# Patient Record
Sex: MALE | Race: WHITE | NOT HISPANIC OR LATINO | Employment: FULL TIME | URBAN - METROPOLITAN AREA
[De-identification: names, ages, dates, MRNs, and addresses within clinical notes are randomized per-mention and may not be internally consistent; named-entity substitution may affect disease eponyms.]

---

## 2018-05-30 ENCOUNTER — OFFICE VISIT (OUTPATIENT)
Dept: INTERNAL MEDICINE | Facility: CLINIC | Age: 28
End: 2018-05-30
Payer: COMMERCIAL

## 2018-05-30 VITALS
DIASTOLIC BLOOD PRESSURE: 63 MMHG | HEART RATE: 67 BPM | OXYGEN SATURATION: 96 % | SYSTOLIC BLOOD PRESSURE: 97 MMHG | BODY MASS INDEX: 21.8 KG/M2 | WEIGHT: 150.6 LBS

## 2018-05-30 DIAGNOSIS — H69.92 DYSFUNCTION OF LEFT EUSTACHIAN TUBE: Primary | ICD-10-CM

## 2018-05-30 ASSESSMENT — PAIN SCALES - GENERAL: PAINLEVEL: NO PAIN (0)

## 2018-05-30 NOTE — MR AVS SNAPSHOT
After Visit Summary   5/30/2018    Flip Lopez    MRN: [id number]           Patient Information     Date Of Birth          1990        Visit Information        Provider Department      5/30/2018 10:00 AM Cherry Ricketts APRN [facility]        Today's Diagnoses     Dysfunction of left eustachian tube    -  1      Care Instructions      Earache, No Infection (Adult)  Earaches can happen without an infection. This occurs when air and fluid build up behind the eardrum causing a feeling of fullness and discomfort and reduced hearing. This is called otitis media with effusion (OME) or serous otitis media. It means there is fluid in the middle ear. It is not the same as acute otitis media, which is typically from infection.  OME can happen when you have a cold if congestion blocks the passage that drains the middle ear. This passage is called the eustachian tube. OME may also occur with nasal allergies or after a bacterial middle ear infection.    The pain or discomfort may come and go. You may hear clicking or popping sounds when you chew or swallow. You may feel that your balance is off. Or you may hear ringing in the ear.  It often takes from several weeks up to 3 months for the fluid to clear on its own. Oral pain relievers and ear drops help if there is pain. Decongestants and antihistamines sometimes help. Antibiotics don't help since there is no infection. Your doctor may prescribe a nasal spray to help reduce swelling in the nose and eustachian tube. This can allow the ear to drain.  If your OME doesn't improve after 3 months, surgery may be used to drain the fluid and insert a small tube in the eardrum to allow continued drainage.  Because the middle ear fluid can become infected, it is important to watch for signs of an ear infection which may develop later. These signs include increased ear pain, fever, or drainage from the ear.  Home care  The following  guidelines will help you care for yourself at home:    You may use over-the-counter medicine as directed to control pain, unless another medicine was prescribed. If you have chronic liver or kidney disease or ever had a stomach ulcer or GI bleeding, talk with your doctor before using these medicines. Aspirin should never be used in anyone under 18 years of age who is ill with a fever. It may cause severe liver damage.    You may use over-the-counter decongestants such as phenylephrine or pseudoephedrine. But they are not always helpful. Don't use nasal spray decongestants more than 3 days. Longer use can make congestion worse. Prescription nasal sprays from your doctor don't typically have those restrictions.    Antihistamines may help if you are also having allergy symptoms.    You may use medicines such as guaifenesin to thin mucus and promote drainage.  Follow-up care  Follow up with your healthcare provider or as advised if you are not feeling better after 3 days.  When to seek medical advice  Call your healthcare provider right away if any of the following occur:    Your ear pain gets worse or does not start to improve     Fever of 100.4 F (38 C) or higher, or as directed by your healthcare provider    Fluid or blood draining from the ear    Headache or sinus pain    Stiff neck    Unusual drowsiness or confusion  Date Last Reviewed: 10/1/2016    3545-4601 [employer]. [address] [place] [address]. All rights reserved. This information is not intended as a substitute for professional medical care. Always follow your healthcare professional's instructions.                Follow-ups after your visit        Your next 10 appointments already scheduled     Jul 11, 2018  3:45 PM CDT   (Arrive by 3:30 PM)   PHYSICAL with Abisai Arrington MD   [facility] ([facility])    [address] [facility]  [address] [facility] [address] [phone]               Who to contact     Please call your clinic at [phone] to:    Ask questions about your health    Make or cancel appointments    Discuss your medicines    Learn about your test results    Speak to your doctor            Additional Information About Your Visit        MyChart Information     [employer] is an electronic gateway that provides easy, online access to your medical records. With [employer], you can request a clinic appointment, read your test results, renew a prescription or communicate with your care team.     To sign up for [employer] visit the website at [email or web address]   You will be asked to enter the access code listed below, as well as some personal information. Please follow the directions to create your username and password.     Your access code is: [email or web address]  Expires: 2018  9:27 AM     Your access code will  in 90 days. If you need help or a new code, please contact your [facility] or call [phone] for assistance.        Care EveryWhere ID     This is your Care EveryWhere ID. This could be used by other organizations to access your [facility] medical records  [id number]        Your Vitals Were     Pulse Pulse Oximetry BMI (Body Mass Index)             67 96% 21.8 kg/m2          Blood Pressure from Last 3 Encounters:   18 97/63   03/09/15 114/76   09/10/14 132/74    Weight from Last 3 Encounters:   18 68.3 kg (150 lb 9.6 oz)   16 74.8 kg (165 lb)   03/09/15 69.4 kg (153 lb 1.6 oz)              Today, you had the following     No orders found for display       Primary Care Provider Office Phone # Fax #    Abisai Arrington [phone]       [address] [facility] [address]        Equal Access to Services     AUSTIN TOLBERT : Amanda Hunt, bharat dent, rui calles. So [facility] [phone].    ATENCIÓN: jeromy Howard  a covarrubias disposición servicios gratuitos de asistencia lingüística. Michael arceo [phone].    We comply with applicable federal civil rights laws and [place] laws. We do not discriminate on the basis of race, color, national origin, age, disability, sex, sexual orientation, or gender identity.            Thank you!     Thank you for choosing [facility]  for your care. Our goal is always to provide you with excellent care. Hearing back from our patients is one way we can continue to improve our services. Please take a few minutes to complete the written survey that you may receive in the mail after your visit with us. Thank you!             Your Updated Medication List - Protect others around you: Learn how to safely use, store and throw away your medicines at www.disposemymeds.org.          This list is accurate as of 5/30/18 10:05 AM.  Always use your most recent med list.                   Brand Name Dispense Instructions for use Diagnosis    cetirizine 10 MG tablet    zyrTEC     Take 10 mg by mouth daily as needed for allergies        fluticasone 50 MCG/ACT spray    FLONASE     Spray 1 spray into both nostrils daily        IBUPROFEN PO      Take 2 tablets by mouth as needed for moderate pain

## 2018-05-30 NOTE — PROGRESS NOTES
[facility]  Primary Care Center   Cherry LEnma Ricketts, JETT CNP  05/30/2018      Chief Complaint:   Otalgia       History of Present Illness:   Flip Lopez is a 28 year old male with a history of pharyngitis, recurrent tonsillitis and tonsillar abcess who presents for evaluation of ear pain. The patient has had some dull left ear pain and muddled noises for about a week now. The only incident he could remember was opening a window in the car on a road trip and a strong hannah of wind causing a loud noise. He took a Q-tip to see if anything was blocking it but couldn't find anything. The patient does have seasonal allergies and takes a generic Zyrtec and nasal spray to help manage this.       Review of Systems:   Pertinent items are noted in HPI, remainder of complete ROS is negative.      Active Medications:     Current Outpatient Prescriptions:      cetirizine (ZYRTEC) 10 MG tablet, Take 10 mg by mouth daily as needed for allergies, Disp: , Rfl:      fluticasone (FLONASE) 50 MCG/ACT spray, Spray 1 spray into both nostrils daily, Disp: , Rfl:      IBUPROFEN PO, Take 2 tablets by mouth as needed for moderate pain, Disp: , Rfl:       Allergies:   No known drug allergies.      Past Medical History:  History of strep sore throat  Pharyngitis  Recurrent tonsillitis  Tonsillar abscess       Past Surgical History:  PE tubes  Tonsillectomy  [facility]    Family History:   No chronic family history to note.      Social History:   Tobacco Use: none  Alcohol Use: none  PCP: Abisai Arrington      Physical Exam:   BP 97/63 (BP Location: Right arm, Patient Position: Sitting, Cuff Size: Adult Regular)  Pulse 67  Wt 68.3 kg (150 lb 9.6 oz)  SpO2 96%  BMI 21.8 kg/m2   Constitutional: Alert, oriented, pleasant, no acute distress  Head: Normocephalic, atraumatic  Eyes: Extra-ocular movements intact, pupils equally round and reactive bilaterally, no scleral icterus  Ears: Bilateral tympanic membranes pearly gray with  positive light reflex, mildly distended with cerous fluid, EACs clear  ENT: Oropharynx clear, moist mucus membranes, good dentition  Neck: Supple, no lymphadenopathy, no thyromegaly  Cardiovascular: Regular rate and rhythm, no murmurs, rubs or gallops, peripheral pulses full/symmetric  Respiratory: Good air movement bilaterally, lungs clear, no wheezes/rales/rhonchi  Psychiatric: normal mentation, affect and mood     Assessment and Plan:  Dysfunction of left eustachian tube  I recommended Sudafed tablets to help reduce pressure in the ears. Advised staying well hydrated. Continue with Zyrtec and nasal spray; see pt instructions. If the problem worsens with any pain, fever, or decreased hearing, he should follow-up in clinic.       Follow-up: As needed.          Scribe Disclosure:   I, Yazmin Barboza, am serving as a scribe to document services personally performed by JETT Frias CNP at this visit, based upon the provider's statements to me. All documentation has been reviewed by the aforementioned provider prior to being entered into the official medical record.     Portions of this medical record were completed by a scribe. UPON MY REVIEW AND AUTHENTICATION BY ELECTRONIC SIGNATURE, this confirms (a) I performed the applicable clinical services, and (b) the record is accurate.     JETT Frias CNP

## 2018-05-30 NOTE — PATIENT INSTRUCTIONS
Earache, No Infection (Adult)  Earaches can happen without an infection. This occurs when air and fluid build up behind the eardrum causing a feeling of fullness and discomfort and reduced hearing. This is called otitis media with effusion (OME) or serous otitis media. It means there is fluid in the middle ear. It is not the same as acute otitis media, which is typically from infection.  OME can happen when you have a cold if congestion blocks the passage that drains the middle ear. This passage is called the eustachian tube. OME may also occur with nasal allergies or after a bacterial middle ear infection.    The pain or discomfort may come and go. You may hear clicking or popping sounds when you chew or swallow. You may feel that your balance is off. Or you may hear ringing in the ear.  It often takes from several weeks up to 3 months for the fluid to clear on its own. Oral pain relievers and ear drops help if there is pain. Decongestants and antihistamines sometimes help. Antibiotics don't help since there is no infection. Your doctor may prescribe a nasal spray to help reduce swelling in the nose and eustachian tube. This can allow the ear to drain.  If your OME doesn't improve after 3 months, surgery may be used to drain the fluid and insert a small tube in the eardrum to allow continued drainage.  Because the middle ear fluid can become infected, it is important to watch for signs of an ear infection which may develop later. These signs include increased ear pain, fever, or drainage from the ear.  Home care  The following guidelines will help you care for yourself at home:    You may use over-the-counter medicine as directed to control pain, unless another medicine was prescribed. If you have chronic liver or kidney disease or ever had a stomach ulcer or GI bleeding, talk with your doctor before using these medicines. Aspirin should never be used in anyone under 18 years of age who is ill with a fever. It  may cause severe liver damage.    You may use over-the-counter decongestants such as phenylephrine or pseudoephedrine. But they are not always helpful. Don't use nasal spray decongestants more than 3 days. Longer use can make congestion worse. Prescription nasal sprays from your doctor don't typically have those restrictions.    Antihistamines may help if you are also having allergy symptoms.    You may use medicines such as guaifenesin to thin mucus and promote drainage.  Follow-up care  Follow up with your healthcare provider or as advised if you are not feeling better after 3 days.  When to seek medical advice  Call your healthcare provider right away if any of the following occur:    Your ear pain gets worse or does not start to improve     Fever of 100.4 F (38 C) or higher, or as directed by your healthcare provider    Fluid or blood draining from the ear    Headache or sinus pain    Stiff neck    Unusual drowsiness or confusion  Date Last Reviewed: 10/1/2016    7248-1363 [employer]. [address] [place] [address], [place] [address]. All rights reserved. This information is not intended as a substitute for professional medical care. Always follow your healthcare professional's instructions.

## 2018-07-11 ENCOUNTER — OFFICE VISIT (OUTPATIENT)
Dept: FAMILY MEDICINE | Facility: CLINIC | Age: 28
End: 2018-07-11
Payer: COMMERCIAL

## 2018-07-11 VITALS
HEIGHT: 70 IN | OXYGEN SATURATION: 97 % | WEIGHT: 146 LBS | RESPIRATION RATE: 18 BRPM | BODY MASS INDEX: 20.9 KG/M2 | DIASTOLIC BLOOD PRESSURE: 63 MMHG | SYSTOLIC BLOOD PRESSURE: 96 MMHG | HEART RATE: 70 BPM | TEMPERATURE: 98 F

## 2018-07-11 DIAGNOSIS — H65.02 ACUTE SEROUS OTITIS MEDIA OF LEFT EAR, RECURRENCE NOT SPECIFIED: ICD-10-CM

## 2018-07-11 DIAGNOSIS — Z23 IMMUNIZATION DUE: ICD-10-CM

## 2018-07-11 DIAGNOSIS — Z00.00 ROUTINE HISTORY AND PHYSICAL EXAMINATION OF ADULT: Primary | ICD-10-CM

## 2018-07-11 RX ORDER — OXYMETAZOLINE HYDROCHLORIDE 0.05 G/100ML
2 SPRAY NASAL 2 TIMES DAILY
Qty: 20 ML | Refills: 0 | Status: SHIPPED | OUTPATIENT
Start: 2018-07-11 | End: 2021-05-31

## 2018-07-11 ASSESSMENT — PAIN SCALES - GENERAL: PAINLEVEL: NO PAIN (0)

## 2018-07-11 NOTE — NURSING NOTE
Chief Complaint   Patient presents with     Physical     Patient is here for annual physical exam       Yousif Love CMA (AAMA) at 3:46 PM on 7/11/2018

## 2018-07-11 NOTE — PATIENT INSTRUCTIONS
[facility] Medication Refill Request Information:  * Please contact your pharmacy regarding ANY request for medication refills.  ** [facility] Prescription Fax = [phone]  * Please allow 3 business days for routine medication refills.  * Please allow 5 business days for controlled substance medication refills.     [facility] Test Result notification information:  *You will be notified with in 7-10 days of your appointment day regarding the results of your test.  If you are on MyChart you will be notified as soon as the provider has reviewed the results and signed off on them.    [facility] [phone]     Please bring a copy of your Immunization Records at your next visit. Thank you!

## 2018-07-11 NOTE — PROGRESS NOTES
[facility]  Primary Care Center   Abisai Arrington MD  07/11/2018      Chief Complaint:   Physical       History of Present Illness:   Flip Lopez is a 28 year old male with a history of chronic recurrent tonsillitis and tonsillar abscess s/p tonsillectomy (2014), now resolved recurrent infections. He presents alone for his annual physical. He reports that he was evaluated in primary care by Cherry Ricketts on 05/30 for left ear discomfort. He has been using Sudafed and Zyrtec for symptomatic relief. Since this appointment, Flip has only experienced slight resolution of his ear pain. He does occasionally have the sensation of fluid build up behind his left ear, however this is coming and going. He does have an appointment with ENT to follow up next week. Of note, he does have a history of visits to ENT as a child with PE tube placement and for his history of recurrent tonsillitis and tonsillar abscess now s/p tonsillectomy improved. Flip does have occasional neck swelling in relation to his pharyngitis, however this has not been as prominent since his tonsillectomy. He also notes that he is developing pharyngitis symptoms less frequently, only about every 4-6 times a year. This has decreased in frequency as he used to develop symptoms about once every month in the past.     Other:  1. Tetanus- due at this time   2. Recent lipid panel- completed in summer 2018   3. HIV and STI- screening offered  4. Left lower extremity birth jolene- unchanged recently   5. Dental and eye appointments- up to date     Review of Systems:   Pertinent items are noted in HPI, remainder of complete ROS is negative.      Active Medications:      cetirizine (ZYRTEC) 10 MG tablet, Take 10 mg by mouth daily as needed for allergies, Disp: , Rfl:      fluticasone (FLONASE) 50 MCG/ACT spray, Spray 1 spray into both nostrils daily, Disp: , Rfl:      IBUPROFEN PO, Take 2 tablets by mouth as needed for moderate pain, Disp: , Rfl:      "  Allergies:   Review of patient's allergies indicates no known allergies.      Past Medical History:  Streptococcal sore throat   Chronic recurrent tonsillitis   Tonsillar abscess      Past Surgical History:  PE tubes   Tonsillectomy   [facility]     Family History:   Heart disease with stent placement (Mother)- received in 40s  Heart disease with stent placement (Maternal uncle)- received in 50s   Heart disease with pacemaker (Maternal grandfather)   Respiratory disease (Maternal grandmother)   Diabetes     The patient's father and sister are both healthy at this time. Flip's paternal grandparents are both . His family does not have a history of tobacco use.     Social History:   The patient has a significant other and one sister. He is a nonsmoker and does consume alcohol.     Physical Exam:   BP 96/63 (BP Location: Right arm, Patient Position: Sitting, Cuff Size: Adult Regular)  Pulse 70  Temp 98  F (36.7  C) (Oral)  Resp 18  Ht 1.765 m (5' 9.5\")  Wt 66.2 kg (146 lb)  SpO2 97%  BMI 21.25 kg/m2      BMI= Body mass index is 21.25 kg/(m^2).    GENERAL APPEARANCE: healthy, alert and no distress  EYES: Eyes grossly normal to inspection, PERRL and conjunctivae and sclerae normal  HENT: Right ear canal and TM normal. Serous fluid left TM ear. Nose and mouth without ulcers or lesions, oropharynx clear and oral mucous membranes moist  NECK: no adenopathy, no asymmetry, masses, or scars and thyroid normal to palpation   RESP: lungs clear to auscultation - no rales, rhonchi or wheezes  CV: regular rates and rhythm, normal S1 S2, no S3 or S4, no murmur, click or rub, no peripheral edema and peripheral pulses strong  MS: no musculoskeletal defects are noted and gait is age appropriate without ataxia  SKIN: Scattered nevus on the back of no concern. Left leg has erythematous patch birth jolene stable. Left leg inferior to the knee, medial aspect raised dome-shaped dermatofibroma.   NEURO: Normal " strength and tone, mentation intact and speech normal  PSYCH: mentation appears normal and affect normal/bright    Assessment and Plan:  Flip Lopez is a 28 year old male with a history of chronic recurrent tonsillitis and tonsillar abscess s/p tonsillectomy (2014), now resolved recurrent infections. He presents alone for his annual physical. Today he has continued left serous otitis.  1. Acute serous otitis media of left ear, recurrence not specified  Long standing history of ENT related concerns. Most recently, developed left ear discomfort with fluid build up observed on physical examination left ear. Neosynephrine recommended for fluid sensation in left ear with instructions to use 2 sprays into left nare 2x daily for symptomatic ear discomfort relief. Follow up with ENT next week, valsalva maneuvers.  - oxymetazoline (AFRIN) 0.05 % spray  Dispense: 20 mL; Refill: 0    2. Routine history and physical examination of adult  Tetanus booster is due at this time. Administered in clinic today. HIV and STI screening were offered, however deferred at this time. Dental and eye appointments are up to date. Recent lipid panel complete in early summer 2018. Flip will message results via My Chart.   - TDAP VACCINE (BOOSTRIX)  He had biometric screening through work including lipids will sned me results via my chart.    Follow-up: Return in about 1 year (around 7/11/2019) for Physical Exam.         Scribe Disclosure:  I, Luz Gonzalez, am serving as a scribe to document services personally performed by Abisai Arrington MD at this visit, based upon the provider's statements to me. All documentation has been reviewed by the aforementioned provider prior to being entered into the official medical record.     Portions of this medical record were completed by a scribe with my edits. UPON MY REVIEW AND AUTHENTICATION BY ELECTRONIC SIGNATURE, this confirms (a) I performed the applicable clinical services, and (b) the  record is accurate.   Abisai Arrington MD

## 2018-07-11 NOTE — MR AVS SNAPSHOT
After Visit Summary   7/11/2018    Flip Lopez    MRN: [id number]           Patient Information     Date Of Birth          1990        Visit Information        Provider Department      7/11/2018 3:45 PM Abisai Arrington MD [facility]        Today's Diagnoses     Routine history and physical examination of adult    -  1    Immunization due        Acute serous otitis media of left ear, recurrence not specified          Care Instructions    Primary Care Center Medication Refill Request Information:  * Please contact your pharmacy regarding ANY request for medication refills.  ** [facility] Prescription Fax = [phone]  * Please allow 3 business days for routine medication refills.  * Please allow 5 business days for controlled substance medication refills.     Primary Care Center Test Result notification information:  *You will be notified with in 7-10 days of your appointment day regarding the results of your test.  If you are on MyChart you will be notified as soon as the provider has reviewed the results and signed off on them.    Primary Care Center [phone]     Please bring a copy of your Immunization Records at your next visit. Thank you!            Follow-ups after your visit        Follow-up notes from your care team     Discussed this visit Return in about 1 year (around 7/11/2019) for Physical Exam.      Your next 10 appointments already scheduled     Jul 19, 2018  8:30 AM CDT   Walk In From ENT with Ange Bernard   [facility] ([facility])    [address] [place] [address] [phone]            Jul 19, 2018  9:30 AM CDT   (Arrive by 9:15 AM)   New Patient Visit with Leroy Jorgensen MD   [facility] ([facility] [place])    [address] [place] [address] [phone]              Who to contact     Please call your clinic  "at [phone] to:    Ask questions about your health    Make or cancel appointments    Discuss your medicines    Learn about your test results    Speak to your doctor            Additional Information About Your Visit        [employer] Information     [employer] gives you secure access to your electronic health record. If you see a primary care provider, you can also send messages to your care team and make appointments. If you have questions, please call your primary care clinic.  If you do not have a primary care provider, please call [phone] and they will assist you.      [employer] is an electronic gateway that provides easy, online access to your medical records. With [employer], you can request a clinic appointment, read your test results, renew a prescription or communicate with your care team.     To access your existing account, please contact your [facility] or call [phone] for assistance.        Care EveryWhere ID     This is your Care EveryWhere ID. This could be used by other organizations to access your [facility] medical records  [id number]        Your Vitals Were     Pulse Temperature Respirations Height Pulse Oximetry BMI (Body Mass Index)    70 98  F (36.7  C) (Oral) 18 1.765 m (5' 9.5\") 97% 21.25 kg/m2       Blood Pressure from Last 3 Encounters:   07/11/18 96/63   05/30/18 97/63   03/09/15 114/76    Weight from Last 3 Encounters:   07/11/18 66.2 kg (146 lb)   05/30/18 68.3 kg (150 lb 9.6 oz)   12/30/16 74.8 kg (165 lb)              We Performed the Following     TDAP VACCINE (BOOSTRIX)          Today's Medication Changes          These changes are accurate as of 7/11/18  4:24 PM.  If you have any questions, ask your nurse or doctor.               Start taking these medicines.        Dose/Directions    oxymetazoline 0.05 % spray   Commonly known as:  AFRIN   Used for:  Acute serous otitis media of left ear, recurrence not specified   Started by:  Abisai Arrington, " MD        Dose:  2 spray   Spray 2 sprays into left nostril 2 times daily   Quantity:  20 mL   Refills:  0            Where to get your medicines      These medications were sent to [id number] - [place], [place] - [address], [place] [address]     Phone:  [phone]     oxymetazoline 0.05 % spray                Primary Care Provider Office Phone # Fax #    Abisai Arrington [phone]       [address] [facility] [address]        Equal Access to Services     [facility]: Hadii aad ku hadasho Soomaali, waaxda luqadaha, qaybta kaalmada adeegyada, rui gutierrez haycinthia ndiaye . So [facility] [phone].    ATENCIÓN: Si habla español, tiene a covarrubias disposición servicios gratuitos de asistencia lingüística. [facility] [phone].    We comply with applicable federal civil rights laws and [place] laws. We do not discriminate on the basis of race, color, national origin, age, disability, sex, sexual orientation, or gender identity.            Thank you!     Thank you for choosing [facility]  for your care. Our goal is always to provide you with excellent care. Hearing back from our patients is one way we can continue to improve our services. Please take a few minutes to complete the written survey that you may receive in the mail after your visit with us. Thank you!             Your Updated Medication List - Protect others around you: Learn how to safely use, store and throw away your medicines at www.disposemymeds.org.          This list is accurate as of 7/11/18  4:24 PM.  Always use your most recent med list.                   Brand Name Dispense Instructions for use Diagnosis    cetirizine 10 MG tablet    zyrTEC     Take 10 mg by mouth daily as needed for allergies        fluticasone 50 MCG/ACT spray    FLONASE     Spray 1 spray into both nostrils daily        IBUPROFEN PO      Take 2 tablets by  mouth as needed for moderate pain        oxymetazoline 0.05 % spray    AFRIN    20 mL    Spray 2 sprays into left nostril 2 times daily    Acute serous otitis media of left ear, recurrence not specified

## 2018-07-11 NOTE — NURSING NOTE
Administered Tdap vaccine (see Immunizations in Chart Review). Patient tolerated well.  Yousif Love CMA at 4:37 PM on 7/11/2018

## 2018-07-12 DIAGNOSIS — H93.8X9 EAR FULLNESS, UNSPECIFIED LATERALITY: Primary | ICD-10-CM

## 2018-07-13 NOTE — TELEPHONE ENCOUNTER
FUTURE VISIT INFORMATION      FUTURE VISIT INFORMATION:    Date: 07/19/2018    Time: 9:30    Location: [facility]  REFERRAL INFORMATION:    Referring provider:  DR DENTON    Referring providers clinic:  [facility]    Reason for visit/diagnosis  EAR FULLNESS    RECORDS REQUESTED FROM:       Clinic name Comments Records Status Imaging Status   [facility] OFFICE VISIT: 07/11/2018 INTERNAL N/A                                   RECORDS STATUS

## 2018-07-19 ENCOUNTER — PRE VISIT (OUTPATIENT)
Dept: OTOLARYNGOLOGY | Facility: CLINIC | Age: 28
End: 2018-07-19

## 2018-07-19 ENCOUNTER — OFFICE VISIT (OUTPATIENT)
Dept: AUDIOLOGY | Facility: CLINIC | Age: 28
End: 2018-07-19
Payer: COMMERCIAL

## 2018-07-19 ENCOUNTER — OFFICE VISIT (OUTPATIENT)
Dept: OTOLARYNGOLOGY | Facility: CLINIC | Age: 28
End: 2018-07-19
Payer: COMMERCIAL

## 2018-07-19 VITALS — HEIGHT: 70 IN | WEIGHT: 145 LBS | BODY MASS INDEX: 20.76 KG/M2

## 2018-07-19 DIAGNOSIS — H93.8X2 EAR PRESSURE, LEFT: Primary | ICD-10-CM

## 2018-07-19 DIAGNOSIS — H90.3 SNHL (SENSORY-NEURAL HEARING LOSS), ASYMMETRICAL: Primary | ICD-10-CM

## 2018-07-19 ASSESSMENT — PAIN SCALES - GENERAL: PAINLEVEL: NO PAIN (0)

## 2018-07-19 NOTE — NURSING NOTE
"Chief Complaint   Patient presents with     Consult     left ear fullness, ear also feels wet      Height 1.78 m (5' 10.08\"), weight 65.8 kg (145 lb).    David Cordova LPN    "

## 2018-07-19 NOTE — PROGRESS NOTES
The patient presents with a history of a fullness in the left ear for the past few months. He denies previous such symptoms, dizziness, tinnitus or head trauma, migraines or concussions. The patient denies sinusitis, rhinitis, facial pain, nasal obstruction or purulent nasal discharge. The patient denies chronic or recurrent tonsillitis, chronic or recurrent pharyngitis. The patient denies otalgia, otorrhea or ear infections. His Audiogram and Tympanogram are reviewed with him and they demonstrate normal hearing bilaterally and 100% word recognition scores with normal tympanograms.       This patient is seen in consultation at the request of Dr. Abisai Arrington.    All other systems were reviewed and they are either negative or they are not directly pertinent to this Otolaryngology examination.      Past Medical History:    Past Medical History:   Diagnosis Date     History of strep sore throat      Recurrent tonsillitis        Past Surgical History:    Past Surgical History:   Procedure Laterality Date     PE TUBES       TONSILLECTOMY Bilateral 9/10/2014    Procedure: TONSILLECTOMY;  Surgeon: Heather Marie MD;  Location:  OR     [facility]         Medications:      Current Outpatient Prescriptions:      cetirizine (ZYRTEC) 10 MG tablet, Take 10 mg by mouth daily as needed for allergies, Disp: , Rfl:      fluticasone (FLONASE) 50 MCG/ACT spray, Spray 1 spray into both nostrils daily, Disp: , Rfl:      IBUPROFEN PO, Take 2 tablets by mouth as needed for moderate pain, Disp: , Rfl:      oxymetazoline (AFRIN) 0.05 % spray, Spray 2 sprays into left nostril 2 times daily, Disp: 20 mL, Rfl: 0    Allergies:    Seasonal allergies    Physical Examination:    The patient is a well developed, well nourished male in no apparent distress.  He is normocepahlic, atraumatic with pupils equally round and reactive to light.    Oral Cavity Examination: Normal Mucosa with no masses or lesions  Nasal Examination:  Normal Mucosa with no masses or lesions  Ear Examination: Ear canals clear, tympanic membranes and middle ear spaces normal  Neurological Examination: Facial nerve function intact and symmetric  Integumentary Examination: No lesions on the skin of the head or neck  Neck Examination: No masses or lesions, no lymphadenopathy  Endocrine Examination: Normal thyroid examination    Assessment and Plan:    The patient presents with a history of fullness and a sense of altered hearing in the left ear. He will be referred for an MRI scan of the head and an ECOG test and he will be seen again after this testing is completed.     Copy to: Dr. Abisai Arrington

## 2018-07-19 NOTE — PATIENT INSTRUCTIONS
The patient presents with a history of fullness and a sense of altered hearing in the left ear. He will be referred for an MRI scan of the head and an ECOG test and he will be seen again after this testing is completed.

## 2018-07-19 NOTE — MR AVS SNAPSHOT
After Visit Summary   7/19/2018    Flip Lopez    MRN: [id number]           Patient Information     Date Of Birth          1990        Visit Information        Provider Department      7/19/2018 9:30 AM Leroy Jorgensen MD [facility]        Today's Diagnoses     SNHL (sensory-neural hearing loss), asymmetrical    -  1      Care Instructions    The patient presents with a history of fullness and a sense of altered hearing in the left ear. He will be referred for an MRI scan of the head and an ECOG test and he will be seen again after this testing is completed.           Follow-ups after your visit        Additional Services     AUDIOLOGY BALANCE REFERRAL       ECOG                  Your next 10 appointments already scheduled     Jul 30, 2018  1:45 PM CDT   MR BRAIN W/O & W CONTRAST with [address] [facility] [place] [facility] ([facility])    [address] [place] [address] [phone]           Take your medicines as usual, unless your doctor tells you not to. Bring a list of your current medicines to your exam (including vitamins, minerals and over-the-counter drugs).  You may or may not receive intravenous (IV) contrast for this exam pending the discretion of the Radiologist.  You do not need to do anything special to prepare.  The MRI machine uses a strong magnet. Please wear clothes without metal (snaps, zippers). A sweatsuit works well, or we may give you a hospital gown.  Please remove any body piercings and hair extensions before you arrive. You will also remove watches, jewelry, hairpins, wallets, dentures, partial dental plates and hearing aids. You may wear contact lenses, and you may be able to wear your rings. We have a safe place to keep your personal items, but it is safer to leave them at home.  **IMPORTANT** THE INSTRUCTIONS BELOW ARE ONLY FOR THOSE PATIENTS WHO HAVE BEEN PRESCRIBED SEDATION OR  GENERAL ANESTHESIA DURING THEIR MRI PROCEDURE:  IF YOUR DOCTOR PRESCRIBED ORAL SEDATION (take medicine to help you relax during your exam):   You must get the medicine from your doctor (oral medication) before you arrive. Bring the medicine to the exam. Do not take it at home. You ll be told when to take it upon arriving for your exam.   Arrive one hour early. Bring someone who can take you home after the test. Your medicine will make you sleepy. After the exam, you may not drive, take a bus or take a taxi by yourself.  IF YOUR DOCTOR PRESCRIBED IV SEDATION:   Arrive one hour early. Bring someone who can take you home after the test. Your medicine will make you sleepy. After the exam, you may not drive, take a bus or take a taxi by yourself.   No eating 6 hours before your exam. You may have clear liquids up until 4 hours before your exam. (Clear liquids include water, clear tea, black coffee and fruit juice without pulp.)  IF YOUR DOCTOR PRESCRIBED ANESTHESIA (be asleep for your exam):   Arrive 1 1/2 hours early. Bring someone who can take you home after the test. You may not drive, take a bus or take a taxi by yourself.   No eating 8 hours before your exam. You may have clear liquids up until 4 hours before your exam. (Clear liquids include water, clear tea, black coffee and fruit juice without pulp.)   You will spend four to five hours in the recovery room.  Please call the Imaging Department at your exam site with any questions.            Aug 02, 2018  8:00 AM CDT   Electrocochleography with Ange Vanessa, RUDOLPH GRAY ABR MACHINE [address] [place] [address] Audiology ([facility] [place])    [address] [place] [address] [phone]           ECO: Electrocochleography  How is the test done?  The test records electrical signals made by the inner ear and auditory nerve.   First, we clean the skin before placing sticky patches (electrodes) in several places on the head and  shoulders.   One more electrode is placed in the ear canal. This is a small piece of cotton, soaked in water, connected by a tiny wire.   The examiner looks through a microscope to place the electrode on the eardrum. Most patients feel a tickle or slight pressure when this is done. A few patients feel mild discomfort.   After the cotton is in place, most patients do not feel it at all. A foam plug placed in the ear canal holds the electrode in place.   The plug also serves as an earphone for sounds presented as part of the test. The patient hears very rapid clicks that are loud but usually not uncomfortable.   The patient lies on a bed and should try to relax. Many people sleep through the test.  ABR (auditory brainstem response) test The ABR test is usually done at the same time as ECOG. This test records electrical signals made by the brain when it hears sounds. The electrodes can also record brain signals so that both tests can be done at the same time. The two tests together can help find out where your symptoms come from. The two tests usually take about 2 hours. It takes another hour to read the results and write a report. The report is sent to the referring doctor who will choose a treatment.             Sep 19, 2018 10:30 AM CDT   (Arrive by 10:15 AM)   Balance Testing with Denia Kaiser [facility] ([facility] [place])    [address] [place] [address] [phone]           You are scheduled for the following tests: VNG (Videonystagmography). You will wear goggles with small cameras. These record small eye movements as you change position. This test takes 1 to 1 1/2 hours. Rotational Chair. You will sit a chair that has a motor. The room will be dark. You will wear goggles with a camera while the chair rocks slowly from side to side. This test takes 20 to 30 minutes. CDP (Computerized Dynamic Posturography). You will stand on a platform with your  eyes open or closed. It will be unsteady at times. This will tell us how your balance systems work together and how well you sense the ground under your feet. This test takes 30 minutes.  Why am I having these tests? These are tests for your inner ear. They may help us find out why you feel dizzy or off balance.  How do I prepare? Below is a list of things that can affect test results. Do NOT take these for 48 hours before your tests or we will need to reschedule. We want to make sure your test results are correct. Ask your doctor if you have questions about stopping any medicine.  48 hours before the tests: Stop drinking alcohol (beer, wine, liquor). Do NOT take Amitriptyline. Do NOT take medicines for: Allergy or colds. Examples: Benadryl (diphenhydramine), Allegra (fexofenadine), Zyrtec (cetirizine) and any over-the-counter medicine that may cause drowsiness. Anti-anxiety, unless you have been on them every day for the past 8 weeks or more. Examples: Xanax (alprazolam), Klonopin (clonazepam), Valium (diazepam), Ativan (lorazepam). Dizziness and nausea. Examples: Antivert/Bonine/Dramamine Less-Drowsy Formula (meclizine), Dramamine (dimenhydrinate), Trans-derm Scop (Scopolamine), Compazine (prochlorperazine), Phenergan (promethazine). Sleeping. This includes sleeping pills, sedatives, tranquilizers, muscle relaxants and narcotics. It is okay to take medicines for diabetes, heart, blood pressure, seizures, thyroid or an ongoing condition.  The day of the tests:   Please have a  with you.   Do not have caffeine (coffee, soda, chocolate, energy drinks).   Do not smoke or have nicotine for at least 4 hours before the tests. This includes tobacco, e-cigarettes and nicotine gum.   Do not eat 2 to 3 hours before the tests, unless you have diabetes. Then, you should follow your usual diet.   Do not wear any make-up or lotions around your eyes or eyelids.   If you wear contact lenses, be prepared to take them out for  testing; or wear your glasses.   If you take the anti-nausea medicine Zofran (ondansetron), you may bring it with you to take after your tests.  Who should I call if I have questions? If you have any questions, please call the Balance Center at [facility]: [phone]            Sep 20, 2018  1:15 PM CDT   (Arrive by 1:00 PM)   Return Visit with Leroy Jorgensen MD   [facility] Nose [facility] Throat ([facility] [place])    [address] [address] [phone]              Future tests that were ordered for you today     Open Future Orders        Priority Expected Expires Ordered    MRI Brain and Skull Base w & w/o contrast Routine  7/19/2019 7/19/2018    Urea nitrogen Routine  7/19/2019 7/19/2018    Creatinine Routine  7/19/2019 7/19/2018            Who to contact     Please call your clinic at [phone] to:    Ask questions about your health    Make or cancel appointments    Discuss your medicines    Learn about your test results    Speak to your doctor            Additional Information About Your Visit        [employer] Information     [employer] gives you secure access to your electronic health record. If you see a primary care provider, you can also send messages to your care team and make appointments. If you have questions, please call your primary care clinic.  If you do not have a primary care provider, please call [phone] and they will assist you.      [employer] is an electronic gateway that provides easy, online access to your medical records. With [employer], you can request a clinic appointment, read your test results, renew a prescription or communicate with your care team.     To access your existing account, please contact your [facility] or call [phone] for assistance.        Care EveryWhere ID     This is your Care EveryWhere ID. This could be used by other organizations to access your  "[facility] medical records  [id number]        Your Vitals Were     Height BMI (Body Mass Index)                1.78 m (5' 10.08\") 20.76 kg/m2           Blood Pressure from Last 3 Encounters:   07/11/18 96/63   05/30/18 97/63   03/09/15 114/76    Weight from Last 3 Encounters:   07/19/18 65.8 kg (145 lb)   07/11/18 66.2 kg (146 lb)   05/30/18 68.3 kg (150 lb 9.6 oz)              We Performed the Following     AUDIOLOGY BALANCE REFERRAL        Primary Care Provider Office Phone # Fax #    Abisai Arrington [phone]       [address] [facility] [address]        Equal Access to Services     [place] : Hadjeremias paulsono Sofransisca, waaxda luqadaha, qaybta kaalmada adeegyada, rui ndiaye . So [facility] [phone].    ATENCIÓN: Si habla español, tiene a covarrubias disposición servicios gratuitos de asistencia lingüística. [facility] [phone].    We comply with applicable federal civil rights laws and [place] laws. We do not discriminate on the basis of race, color, national origin, age, disability, sex, sexual orientation, or gender identity.            Thank you!     Thank you for choosing [facility]  for your care. Our goal is always to provide you with excellent care. Hearing back from our patients is one way we can continue to improve our services. Please take a few minutes to complete the written survey that you may receive in the mail after your visit with us. Thank you!             Your Updated Medication List - Protect others around you: Learn how to safely use, store and throw away your medicines at www.disposemymeds.org.          This list is accurate as of 7/19/18 11:45 AM.  Always use your most recent med list.                   Brand Name Dispense Instructions for use Diagnosis    cetirizine 10 MG tablet    zyrTEC     Take 10 mg by mouth daily as needed for allergies        fluticasone 50 MCG/ACT spray    FLONASE     Spray 1 spray into " both nostrils daily        IBUPROFEN PO      Take 2 tablets by mouth as needed for moderate pain        oxymetazoline 0.05 % spray    AFRIN    20 mL    Spray 2 sprays into left nostril 2 times daily    Acute serous otitis media of left ear, recurrence not specified

## 2018-07-19 NOTE — MR AVS SNAPSHOT
After Visit Summary   7/19/2018    Flip Lopez    MRN: [id number]           Patient Information     Date Of Birth          1990        Visit Information        Provider Department      7/19/2018 8:30 AM Candy Coy AuD [facility] Audiology        Today's Diagnoses     Ear pressure, left    -  1       Follow-ups after your visit        Your next 10 appointments already scheduled     Jul 19, 2018  9:30 AM CDT   (Arrive by 9:15 AM)   New Patient Visit with Leroy Jorgensen MD   [facility] Ear Nose and Throat ([facility] [place])    [address] [place] [address] [phone]              Who to contact     Please call your clinic at [phone] to:    Ask questions about your health    Make or cancel appointments    Discuss your medicines    Learn about your test results    Speak to your doctor            Additional Information About Your Visit        MyChart Information     M[employer]ent gives you secure access to your electronic health record. If you see a primary care provider, you can also send messages to your care team and make appointments. If you have questions, please call your primary care clinic.  If you do not have a primary care provider, please call [phone] and they will assist you.      Mavent is an electronic gateway that provides easy, online access to your medical records. With Mavent, you can request a clinic appointment, read your test results, renew a prescription or communicate with your care team.     To access your existing account, please contact your [facility] or call [phone] for assistance.        Care EveryWhere ID     This is your Care EveryWhere ID. This could be used by other organizations to access your [place] medical records  [id number]         Blood Pressure from Last 3 Encounters:   07/11/18 96/63   05/30/18 97/63   03/09/15 114/76    Weight from Last 3  Encounters:   07/11/18 66.2 kg (146 lb)   05/30/18 68.3 kg (150 lb 9.6 oz)   12/30/16 74.8 kg (165 lb)              We Performed the Following     AUDIOGRAM/TYMPANOGRAM - INTERFACE     [facility] Eval & Speech Recog (69423)     Tymps / Reflex   (33205)        Primary Care Provider Office Phone # Fax #    Abisai Arrington [phone]        [facility] [address]        Equal Access to Services     [facility]: Hadii aad ku hadasho Soomaali, waaxda luqadaha, qaybta kaalmada adeegyada, waxwild gutierrez haycinthia ndiaye . So [facility] [phone].    ATENCIÓN: Si habla español, tiene a covarrubias disposición servicios gratuitos de asistencia lingüística. [facility] [phone].    We comply with applicable federal civil rights laws and [place] laws. We do not discriminate on the basis of race, color, national origin, age, disability, sex, sexual orientation, or gender identity.            Thank you!     Thank you for choosing [facility] AUDIOLOGY  for your care. Our goal is always to provide you with excellent care. Hearing back from our patients is one way we can continue to improve our services. Please take a few minutes to complete the written survey that you may receive in the mail after your visit with us. Thank you!             Your Updated Medication List - Protect others around you: Learn how to safely use, store and throw away your medicines at www.disposemymeds.org.          This list is accurate as of 7/19/18  8:43 AM.  Always use your most recent med list.                   Brand Name Dispense Instructions for use Diagnosis    cetirizine 10 MG tablet    zyrTEC     Take 10 mg by mouth daily as needed for allergies        fluticasone 50 MCG/ACT spray    FLONASE     Spray 1 spray into both nostrils daily        IBUPROFEN PO      Take 2 tablets by mouth as needed for moderate pain        oxymetazoline 0.05 % spray    AFRIN    20 mL    Spray 2 sprays into left  nostril 2 times daily    Acute serous otitis media of left ear, recurrence not specified

## 2018-07-19 NOTE — LETTER
7/19/2018       RE: Flip Lopez  [address] [place] [address]     Dear Colleague,    Thank you for referring your patient, Flip Lopez, to the [facility] EAR NOSE AND THROAT at [facility]. Please see a copy of my visit note below.    The patient presents with a history of a fullness in the left ear for the past few months. He denies previous such symptoms, dizziness, tinnitus or head trauma, migraines or concussions. The patient denies sinusitis, rhinitis, facial pain, nasal obstruction or purulent nasal discharge. The patient denies chronic or recurrent tonsillitis, chronic or recurrent pharyngitis. The patient denies otalgia, otorrhea or ear infections. His Audiogram and Tympanogram are reviewed with him and they demonstrate normal hearing bilaterally and 100% word recognition scores with normal tympanograms.       This patient is seen in consultation at the request of Dr. Abisai Arrington.    All other systems were reviewed and they are either negative or they are not directly pertinent to this Otolaryngology examination.      Past Medical History:    Past Medical History:   Diagnosis Date     History of strep sore throat      Recurrent tonsillitis        Past Surgical History:    Past Surgical History:   Procedure Laterality Date     PE TUBES       TONSILLECTOMY Bilateral 9/10/2014    Procedure: TONSILLECTOMY;  Surgeon: Haether Marie MD;  Location:  OR     [facility]         Medications:      Current Outpatient Prescriptions:      cetirizine (ZYRTEC) 10 MG tablet, Take 10 mg by mouth daily as needed for allergies, Disp: , Rfl:      fluticasone (FLONASE) 50 MCG/ACT spray, Spray 1 spray into both nostrils daily, Disp: , Rfl:      IBUPROFEN PO, Take 2 tablets by mouth as needed for moderate pain, Disp: , Rfl:      oxymetazoline (AFRIN) 0.05 % spray, Spray 2 sprays into left nostril 2 times daily, Disp: 20 mL, Rfl:  0    Allergies:    Seasonal allergies    Physical Examination:    The patient is a well developed, well nourished male in no apparent distress.  He is normocepahlic, atraumatic with pupils equally round and reactive to light.    Oral Cavity Examination: Normal Mucosa with no masses or lesions  Nasal Examination: Normal Mucosa with no masses or lesions  Ear Examination: Ear canals clear, tympanic membranes and middle ear spaces normal  Neurological Examination: Facial nerve function intact and symmetric  Integumentary Examination: No lesions on the skin of the head or neck  Neck Examination: No masses or lesions, no lymphadenopathy  Endocrine Examination: Normal thyroid examination    Assessment and Plan:    The patient presents with a history of fullness and a sense of altered hearing in the left ear. He will be referred for an MRI scan of the head and an ECOG test and he will be seen again after this testing is completed.     Copy to: Dr. Abisai Arrington      Again, thank you for allowing me to participate in the care of your patient.      Sincerely,    Leroy Jorgensen MD

## 2018-07-30 ENCOUNTER — RADIANT APPOINTMENT (OUTPATIENT)
Dept: MRI IMAGING | Facility: CLINIC | Age: 28
End: 2018-07-30
Attending: OTOLARYNGOLOGY
Payer: COMMERCIAL

## 2018-07-30 DIAGNOSIS — H90.3 SNHL (SENSORY-NEURAL HEARING LOSS), ASYMMETRICAL: ICD-10-CM

## 2018-07-30 RX ORDER — GADOBUTROL 604.72 MG/ML
7.5 INJECTION INTRAVENOUS ONCE
Status: COMPLETED | OUTPATIENT
Start: 2018-07-30 | End: 2018-07-30

## 2018-07-30 RX ADMIN — GADOBUTROL 6.5 ML: 604.72 INJECTION INTRAVENOUS at 13:24

## 2018-07-30 NOTE — DISCHARGE INSTRUCTIONS
MRI Contrast Discharge Instructions    The IV contrast you received today will pass out of your body in your  urine. This will happen in the next 24 hours. You will not feel this process.  Your urine will not change color.    Drink at least 4 extra glasses of water or juice today (unless your doctor  has restricted your fluids). This reduces the stress on your kidneys.  You may take your regular medicines.    If you are on dialysis: It is best to have dialysis today.    If you have a reaction: Most reactions happen right away. If you have  any new symptoms after leaving the hospital (such as hives or swelling),  call your hospital at the correct number below. Or call your family doctor.  If you have breathing distress or wheezing, call 911.    Special instructions: ***    I have read and understand the above information.    Signature:______________________________________ Date:___________    Staff:__________________________________________ Date:___________     Time:__________    [place] [facility] Departments:    ___[place]: [phone]  ___[facility]: [phone]  ___[place]: [phone] ___[facility]: [phone]  ___[facility]: [phone]  ___[facility]: [phone]  ___[phone]  ___[facility]: [phone]  ___[phone]

## 2018-08-02 ENCOUNTER — CARE COORDINATION (OUTPATIENT)
Dept: OTOLARYNGOLOGY | Facility: CLINIC | Age: 28
End: 2018-08-02

## 2018-08-02 ENCOUNTER — OFFICE VISIT (OUTPATIENT)
Dept: AUDIOLOGY | Facility: CLINIC | Age: 28
End: 2018-08-02
Payer: COMMERCIAL

## 2018-08-02 DIAGNOSIS — H93.8X2 PLUGGED FEELING IN EAR, LEFT: Primary | ICD-10-CM

## 2018-08-02 NOTE — PROGRESS NOTES
"AUDIOLOGY REPORT    BACKGROUND INFORMATION: Flip Lopez was seen in Audiology at the [facility] [place] on 8/2/2018 for an electrocochleography (ECochG) evaluation, referred by Dr Leroy Jorgensen. The patient reports that in May of 2018 he started to experience the sensation of \"clogging\" of his left ear. The patient did see a physician for this and was presrived sudafed for 2 weeks and then Afrin for one week and notes that the left-ear symptom is now present episodically.  The patient reports that he did used to have a habit of clenching his jaw in the past but that he has been mindful of this and he is sure that there are no current jaw issues. The most recent hearing evaluation performed 7/19/2018 revealed normal hearing bilaterally with no air-bone gaps present and with symmetry noted between the ears. The patient denies any other ear related issues, vertigo, history of chronic middle ear issues and history of head trauma. The patient is scheduled for an upcoming videonystagmography on 9/19/2018.    TEST RESULTS AND PROCEDURES: Tympanograms showed normal eardrum mobility bilaterally. Using a microscope tympanic membranes were visualized.     A two-channel ECochG recording was performed for clicks bilaterally.  Clicks for the right ear showed normal SP/AP ratios.    Clicks for the left ear showed normal SP/AP ratios.         Click SP/AP ratio   Right ear  .104   Left ear  .216     Abrnormal SP/AP ratios must be greater than .43 for clicks.     SUMMARY AND RECOMMENDATIONS: Today s ECochG revealed normal SP/AP ratios.  Please call this clinic with questions regarding today s results.  Follow-up with Dr Leroy Mota for medical management.    Bia Hall.  Licensed Audiologist  [place] #[id number]              "

## 2018-08-02 NOTE — MR AVS SNAPSHOT
After Visit Summary   8/2/2018    Flip Lopez    MRN: [id number]           Patient Information     Date Of Birth          1990        Visit Information        Provider Department      8/2/2018 8:00 AM Lisa Hanley AuD;  [facility] Audiology        Today's Diagnoses     Plugged feeling in ear, left    -  1       Follow-ups after your visit        Your next 10 appointments already scheduled     Sep 19, 2018 10:30 AM CDT   (Arrive by 10:15 AM)   Balance Testing with Marina Valenzuela [facility] Audiology ([facility] [place])    [address] [address] [phone]           You are scheduled for the following tests: VNG (Videonystagmography). You will wear goggles with small cameras. These record small eye movements as you change position. This test takes 1 to 1 1/2 hours. Rotational Chair. You will sit a chair that has a motor. The room will be dark. You will wear goggles with a camera while the chair rocks slowly from side to side. This test takes 20 to 30 minutes. CDP (Computerized Dynamic Posturography). You will stand on a platform with your eyes open or closed. It will be unsteady at times. This will tell us how your balance systems work together and how well you sense the ground under your feet. This test takes 30 minutes.  Why am I having these tests? These are tests for your inner ear. They may help us find out why you feel dizzy or off balance.  How do I prepare? Below is a list of things that can affect test results. Do NOT take these for 48 hours before your tests or we will need to reschedule. We want to make sure your test results are correct. Ask your doctor if you have questions about stopping any medicine.  48 hours before the tests: Stop drinking alcohol (beer, wine, liquor). Do NOT take Amitriptyline. Do NOT take medicines for: Allergy or colds. Examples: Benadryl (diphenhydramine), Allegra  (fexofenadine), Zyrtec (cetirizine) and any over-the-counter medicine that may cause drowsiness. Anti-anxiety, unless you have been on them every day for the past 8 weeks or more. Examples: Xanax (alprazolam), Klonopin (clonazepam), Valium (diazepam), Ativan (lorazepam). Dizziness and nausea. Examples: Antivert/Bonine/Dramamine Less-Drowsy Formula (meclizine), Dramamine (dimenhydrinate), Trans-derm Scop (Scopolamine), Compazine (prochlorperazine), Phenergan (promethazine). Sleeping. This includes sleeping pills, sedatives, tranquilizers, muscle relaxants and narcotics. It is okay to take medicines for diabetes, heart, blood pressure, seizures, thyroid or an ongoing condition.  The day of the tests:   Please have a  with you.   Do not have caffeine (coffee, soda, chocolate, energy drinks).   Do not smoke or have nicotine for at least 4 hours before the tests. This includes tobacco, e-cigarettes and nicotine gum.   Do not eat 2 to 3 hours before the tests, unless you have diabetes. Then, you should follow your usual diet.   Do not wear any make-up or lotions around your eyes or eyelids.   If you wear contact lenses, be prepared to take them out for testing; or wear your glasses.   If you take the anti-nausea medicine Zofran (ondansetron), you may bring it with you to take after your tests.  Who should I call if I have questions? If you have any questions, please call the Balance Center at [facility]: [phone]            Sep 20, 2018  1:15 PM CDT   (Arrive by 1:00 PM)   Return Visit with Leroy Jorgensen MD   [facility] Ear Nose and Throat ([facility] [place])    [address] [address] [phone]              Who to contact     Please call your clinic at [phone] to:    Ask questions about your health    Make or cancel appointments    Discuss your medicines    Learn about your test results    Speak to your doctor             Additional Information About Your Visit        Gojimohart Information     [email or web address] gives you secure access to your electronic health record. If you see a primary care provider, you can also send messages to your care team and make appointments. If you have questions, please call your primary care clinic.  If you do not have a primary care provider, please call [phone] and they will assist you.      [email or web address] is an electronic gateway that provides easy, online access to your medical records. With [email or web address], you can request a clinic appointment, read your test results, renew a prescription or communicate with your care team.     To access your existing account, please contact your [facility] or call [phone] for assistance.        Care EveryWhere ID     This is your Care EveryWhere ID. This could be used by other organizations to access your [place] medical records  [id number]         Blood Pressure from Last 3 Encounters:   07/11/18 96/63   05/30/18 97/63   03/09/15 114/76    Weight from Last 3 Encounters:   07/19/18 65.8 kg (145 lb)   07/11/18 66.2 kg (146 lb)   05/30/18 68.3 kg (150 lb 9.6 oz)              We Performed the Following     Electrocochleography (04807)     Tympanometry (impedance - testing) (80941)        Primary Care Provider Office Phone # Fax #    Abisai Arrington [phone]       [address] [facility] [address]        Equal Access to Services     [place] TOLBERT : Hadii nathaniel ku hadasho Sopelonali, waaxda luqadaha, qaybta kaalmada adejeremiahyada, rui stauffer. So [facility] [phone].    ATENCIÓN: Si habla español, tiene a covarrubias disposición servicios gratuitos de asistencia lingüística. Llame al [phone].    We comply with applicable federal civil rights laws and [place] laws. We do not discriminate on the basis of race, color, national origin, age, disability, sex, sexual orientation, or gender identity.             Thank you!     Thank you for choosing [facility] AUDIOLOGY  for your care. Our goal is always to provide you with excellent care. Hearing back from our patients is one way we can continue to improve our services. Please take a few minutes to complete the written survey that you may receive in the mail after your visit with us. Thank you!             Your Updated Medication List - Protect others around you: Learn how to safely use, store and throw away your medicines at www.disposemymeds.org.          This list is accurate as of 8/2/18 10:55 AM.  Always use your most recent med list.                   Brand Name Dispense Instructions for use Diagnosis    cetirizine 10 MG tablet    zyrTEC     Take 10 mg by mouth daily as needed for allergies        fluticasone 50 MCG/ACT spray    FLONASE     Spray 1 spray into both nostrils daily        IBUPROFEN PO      Take 2 tablets by mouth as needed for moderate pain        oxymetazoline 0.05 % spray    AFRIN    20 mL    Spray 2 sprays into left nostril 2 times daily    Acute serous otitis media of left ear, recurrence not specified

## 2018-08-02 NOTE — PROGRESS NOTES
[place],    Normal Scan.    Leroy Jorgensen MD on 8/2/2018 at 12:56 AM  8-2-18 above information left on pt voice mail. Pt has balance testing and rtc set up.  Thalia Mcmullen RN  ENT Care Coordinator   Otology  [phone]  8/2/2018 9:01 AM       Details        Reading Physician Reading Date Result Priority       Jesse Parks MD Dianat, Seyed Saeid, MD 7/30/2018 7/30/2018            Narrative             MR BRAIN W/O & W CONTRAST 7/30/2018 2:20 PM    History: ; SNHL (sensory-neural hearing loss), asymmetrical. ?fullness  in the left ear for the past few months.    Comparison: None available    Technique: Axial diffusion and susceptibility-weighted images of the  whole brain were obtained. Coronal 3D T2-weighted and axial  thin-section T1-weighted images were obtained without contrast, with  focus on the internal auditory canals.  Post-intravenous contrast  (using gadolinium) axial and coronal thin-section T1-weighted images  with fat saturation were also obtained, with focus on the internal  auditory canals, with postcontrast T1-weighted images of the whole  brain as well.    Contrast: 6.5ml gadavist    Findings:   No abnormal signal is present in the cerebellum or brainstem. The  seventh and eighth cranial nerves appear normal along their course  intracranially, and the labyrinthine structures are unremarkable on  the T2-weighted images.  The ventricles are normal in size for the  patient's age.    Postcontrast images demonstrate no abnormal enhancement along the  seventh or eighth cranial nerves along their course or of the  labyrinthine structures    Mild mucosal thickening of the maxillary sinuses left greater than  right. No abnormal enhancement is visualized elsewhere intracranially.             Impression             Impression:  1. No abnormality of the seventh or eighth cranial nerves  intracranially.  2. No abnormality of the other visualized infratentorial structures or  supratentorial brain.    I  have personally reviewed the examination and initial interpretation  and I agree with the findings.    SABA DUMONT MD

## 2018-09-19 ENCOUNTER — OFFICE VISIT (OUTPATIENT)
Dept: AUDIOLOGY | Facility: CLINIC | Age: 28
End: 2018-09-19
Payer: COMMERCIAL

## 2018-09-19 DIAGNOSIS — H93.12 TINNITUS OF LEFT EAR: Primary | ICD-10-CM

## 2018-09-19 NOTE — MR AVS SNAPSHOT
After Visit Summary   9/19/2018    Flip Lopez    MRN: [id number]           Patient Information     Date Of Birth          1990        Visit Information        Provider Department      9/19/2018 10:30 AM Denia Kaiser AuD [facility] Audiology        Today's Diagnoses     Tinnitus of left ear    -  1       Follow-ups after your visit        Your next 10 appointments already scheduled     Sep 20, 2018  1:15 PM CDT   (Arrive by 1:00 PM)   Return Visit with Leroy Jorgensen MD   [facility] Ear Nose and Throat ([facility] [place])    [address] [place] [address] [phone]              Who to contact     Please call your clinic at [phone] to:    Ask questions about your health    Make or cancel appointments    Discuss your medicines    Learn about your test results    Speak to your doctor            Additional Information About Your Visit        MyChart Information     Ba[employer]ab gives you secure access to your electronic health record. If you see a primary care provider, you can also send messages to your care team and make appointments. If you have questions, please call your primary care clinic.  If you do not have a primary care provider, please call [phone] and they will assist you.      [employer]ab is an electronic gateway that provides easy, online access to your medical records. With [employer], you can request a clinic appointment, read your test results, renew a prescription or communicate with your care team.     To access your existing account, please contact your [facility] or call [phone] for assistance.        Care EveryWhere ID     This is your Care EveryWhere ID. This could be used by other organizations to access your [place] medical records  [id number]         Blood Pressure from Last 3 Encounters:   07/11/18 96/63   05/30/18 97/63   03/09/15 114/76    Weight from Last 3  Encounters:   07/19/18 65.8 kg (145 lb)   07/11/18 66.2 kg (146 lb)   05/30/18 68.3 kg (150 lb 9.6 oz)              We Performed the Following     Caloric Vestibular Test, W./ Rec, Bilateral, [place], 4 Irrigations (70147)     Oscillating Track Test (12756)     Positional Nystagmus W/ Recording (91748)     Spontaneous Nystagmus W/ Recording (91112)     Tympanometry (impedance - testing) (15275)        Primary Care Provider Office Phone # Fax #    Abisai Arrington [phone] [address] [facility] [address]        Equal Access to Services     [place] TOLBERT : Amanda Hunt, wabeba dent, qaybta kaalmada melchor, rui stauffer. So [facility] [phone].    ATENCIÓN: Si habla español, tiene a covarrubias disposición servicios gratuitos de asistencia lingüística. Llame al [phone].    We comply with applicable federal civil rights laws and [place] laws. We do not discriminate on the basis of race, color, national origin, age, disability, sex, sexual orientation, or gender identity.            Thank you!     Thank you for choosing [facility] AUDIOLOGY  for your care. Our goal is always to provide you with excellent care. Hearing back from our patients is one way we can continue to improve our services. Please take a few minutes to complete the written survey that you may receive in the mail after your visit with us. Thank you!             Your Updated Medication List - Protect others around you: Learn how to safely use, store and throw away your medicines at www.disposemymeds.org.          This list is accurate as of 9/19/18  2:58 PM.  Always use your most recent med list.                   Brand Name Dispense Instructions for use Diagnosis    cetirizine 10 MG tablet    zyrTEC     Take 10 mg by mouth daily as needed for allergies        fluticasone 50 MCG/ACT spray    FLONASE     Spray 1 spray into both nostrils daily        IBUPROFEN PO      Take 2  tablets by mouth as needed for moderate pain        oxymetazoline 0.05 % spray    AFRIN    20 mL    Spray 2 sprays into left nostril 2 times daily    Acute serous otitis media of left ear, recurrence not specified

## 2018-09-19 NOTE — PROGRESS NOTES
AUDIOLOGY REPORT-BALANCE ASSESSMENT    SUBJECTIVE: Flip Lopez, 28 year old, was seen in Audiology at the [facility] on 9/19/2018, for videonystagmography (VNG), referred by Leroy Jorgensen MD. The patient reports that a few months ago he started having symptoms of left ear fullness accompanied by tinnitus in the left ear, with a change in sound quality. He denies hearing fluctuations. Since that time he reports the symptoms in his left ear have improved and he only has a very mild intermittent tinnitus at this time. He denies any feelings of imbalance, dizziness, or spinning sensation through out this time. He denies migraines, head trauma, vision concerns, or any other major medical conditions. Hearing evaluations have revealed normal hearing bilaterally. Flip has not taken any antivestibular medications in the past 48 hours.    OBJECTIVE:  Dizziness Handicap Inventory (DHI): 4/100 no perceived impairment      Videonystagmography (VNG) testing:  Prescreening:  Tympanograms: normal eardrum mobility bilaterally. Note: this test is completed to determine the status of the middle ear before irrigations are completed.  Ocular range of motion and ocular counter roll: Normal  Cross/cover:Normal  Head Thrust: Negative     Nystagmus Tests:  Gaze-Horizontal with fixation:   Center: Normal   Right: Normal   Left: Normal  Gaze-Vertical with fixation:   Up: Normal   Down: Normal  Gaze with fixation denied   Center: Normal   Right: Normal   Left: Normal   Up: Normal  High Frequency Headshake:   Horizontal: Negative   Vertical: Negative    Lexie-Hallpike Head Right: Negative for nystagmus & symptoms   [place]-Hallpike Head Left: Likely negative for PC-BPPV. One burst of 4 deg/sec down-beating with 3 deg/sec left-beating nystagmus that lasts 4-5 seconds. No torsion and no symptoms of dizziness.  Repeat [place]-Hallpike Head Left: Likely negative for PC-BPPV. One burst of 4  deg/sec down-beating with 3 deg/sec left-beating nystagmus that lasts 4-5 seconds. No torsion and no symptoms of dizziness.  Roll Test Head Right: Negative for nystagmus & symptoms   Roll Test Head Left: Negative for nystagmus & symptoms     Positionals: Supine: Normal  Positionals: Body Right: Normal  Positionals: Body Left: Normal  Positionals: Pre-Caloric: Normal    Oculomotor Tests:  Saccades: Normal  Anti-saccades: Pt can complete task  Pursuit: Normal    Calorics :  (Tested at 44 degrees and 30 degrees Celsius for 30 seconds for warm and cool water, respectively):  Right Warm Eye Speed: 60 degrees per second right beating  Left Warm Eye Speed: 71 degrees per second left beating  Right Cool Eye Speed: 38 degrees per second left beating  Left Cool Eye Speed: 29 degrees per second right beating  Difference between ear: 1% right hypofunction. (Greater than 25% considered clinically significant.)  Fixation Index: 0.13 Normal  Overall caloric test: Normal    ASSESSMENT:  1. There were no indications of central vestibular system involvement noted on today's exam.     2. There were no indications of peripheral vestibular system involvement noted on today's exam.     3.Non-localizing; Burst of down-beating nystagmus combined with left-beating nystagmus with no symptoms of dizziness present during left Lexie-Hallpike. Repeatable. Not representative  of traditional PC-BPPV. Possible central origin.     PLAN:  Follow-up with Leroy Jorgensen MD for medical management.  Consider referral to vestibular PT for reassessment of left Lexie-Hallpike. However, as patient is non symptomatic, unsure if any treatment is warranted at this time. Please call this clinic at [phone] with questions regarding these results or recommendations.         Maura Vazquez, [facility]  Licensed Audiologist  [place] #[id number]

## 2018-09-20 ENCOUNTER — OFFICE VISIT (OUTPATIENT)
Dept: OTOLARYNGOLOGY | Facility: CLINIC | Age: 28
End: 2018-09-20
Payer: COMMERCIAL

## 2018-09-20 DIAGNOSIS — M26.609 TEMPOROMANDIBULAR JOINT DISORDER: Primary | ICD-10-CM

## 2018-09-20 ASSESSMENT — PAIN SCALES - GENERAL: PAINLEVEL: NO PAIN (0)

## 2018-09-20 NOTE — PATIENT INSTRUCTIONS
The patient presents with a history of fullness and a sense of altered hearing in the left ear. He was referred for an MRI scan of the head and an ECOG test and these evaluations are negative. He will be referred to our Dental specialists to assess for temporomandibular joint disorder as a cause of his symptoms.

## 2018-09-20 NOTE — LETTER
9/20/2018       RE: Flip Lopez  [address] [place] [address]     Dear Colleague,    Thank you for referring your patient, Flip Lopez, to the [facility] EAR NOSE AND THROAT at [facility]. Please see a copy of my visit note below.    The patient presents with a history of a fullness in the left ear for the past few months. He denies previous such symptoms, dizziness, tinnitus or head trauma, migraines or concussions. The patient's vestibular testing and MRI scan are normal and these findings are reviewed with him. His symptoms have not changed.       All other systems were reviewed and they are either negative or they are not directly pertinent to this Otolaryngology examination.      Past Medical History:    Past Medical History:   Diagnosis Date     History of strep sore throat      Recurrent tonsillitis        Past Surgical History:    Past Surgical History:   Procedure Laterality Date     PE TUBES       TONSILLECTOMY Bilateral 9/10/2014    Procedure: TONSILLECTOMY;  Surgeon: Heather Marie MD;  Location:  OR     [facility]         Medications:      Current Outpatient Prescriptions:      cetirizine (ZYRTEC) 10 MG tablet, Take 10 mg by mouth daily as needed for allergies, Disp: , Rfl:      fluticasone (FLONASE) 50 MCG/ACT spray, Spray 1 spray into both nostrils daily, Disp: , Rfl:      IBUPROFEN PO, Take 2 tablets by mouth as needed for moderate pain, Disp: , Rfl:      oxymetazoline (AFRIN) 0.05 % spray, Spray 2 sprays into left nostril 2 times daily, Disp: 20 mL, Rfl: 0    Allergies:    Seasonal allergies    Physical Examination:    The patient is a well developed, well nourished male in no apparent distress.  He is normocepahlic, atraumatic with pupils equally round and reactive to light.    Oral Cavity Examination: Normal Mucosa with no masses or lesions  Nasal Examination: Normal Mucosa with no masses or  lesions  Neurological Examination: Facial nerve function intact and symmetric  Integumentary Examination: No lesions on the skin of the head or neck   Temporomandibular Joint Examination: Malrotation of the temporomandibular joints bilaterally.     Assessment and Plan:    The patient presents with a history of fullness and a sense of altered hearing in the left ear. He was referred for an MRI scan of the head and an ECOG test and these evaluations are negative. He will be referred to our Dental specialists to assess for temporomandibular joint disorder as a cause of his symptoms.      Copy to: Dr. Abisai Arrington      Again, thank you for allowing me to participate in the care of your patient.      Sincerely,    Leroy Jorgensen MD

## 2018-09-20 NOTE — PROGRESS NOTES
The patient presents with a history of a fullness in the left ear for the past few months. He denies previous such symptoms, dizziness, tinnitus or head trauma, migraines or concussions. The patient's vestibular testing and MRI scan are normal and these findings are reviewed with him. His symptoms have not changed.       All other systems were reviewed and they are either negative or they are not directly pertinent to this Otolaryngology examination.      Past Medical History:    Past Medical History:   Diagnosis Date     History of strep sore throat      Recurrent tonsillitis        Past Surgical History:    Past Surgical History:   Procedure Laterality Date     PE TUBES       TONSILLECTOMY Bilateral 9/10/2014    Procedure: TONSILLECTOMY;  Surgeon: Heather Marie MD;  Location:  OR     [facility]         Medications:      Current Outpatient Prescriptions:      cetirizine (ZYRTEC) 10 MG tablet, Take 10 mg by mouth daily as needed for allergies, Disp: , Rfl:      fluticasone (FLONASE) 50 MCG/ACT spray, Spray 1 spray into both nostrils daily, Disp: , Rfl:      IBUPROFEN PO, Take 2 tablets by mouth as needed for moderate pain, Disp: , Rfl:      oxymetazoline (AFRIN) 0.05 % spray, Spray 2 sprays into left nostril 2 times daily, Disp: 20 mL, Rfl: 0    Allergies:    Seasonal allergies    Physical Examination:    The patient is a well developed, well nourished male in no apparent distress.  He is normocepahlic, atraumatic with pupils equally round and reactive to light.    Oral Cavity Examination: Normal Mucosa with no masses or lesions  Nasal Examination: Normal Mucosa with no masses or lesions  Neurological Examination: Facial nerve function intact and symmetric  Integumentary Examination: No lesions on the skin of the head or neck   Temporomandibular Joint Examination: Malrotation of the temporomandibular joints bilaterally.     Assessment and Plan:    The patient presents with a history of  fullness and a sense of altered hearing in the left ear. He was referred for an MRI scan of the head and an ECOG test and these evaluations are negative. He will be referred to our Dental specialists to assess for temporomandibular joint disorder as a cause of his symptoms.      Copy to: Dr. Abisai Arrington

## 2018-09-20 NOTE — MR AVS SNAPSHOT
After Visit Summary   9/20/2018    Flip Lopez    MRN: [id number]           Patient Information     Date Of Birth          1990        Visit Information        Provider Department      9/20/2018 1:15 PM Leroy Jorgensen MD [facility] Ear Nose [facility] Thro[facility]        Today's Diagnoses     Temporomandibular joint disorder    -  1      Care Instructions    The patient presents with a history of fullness and a sense of altered hearing in the left ear. He was referred for an MRI scan of the head and an ECOG test and these evaluations are negative. He will be referred to our Dental specialists to assess for temporomandibular joint disorder as a cause of his symptoms.            Follow-ups after your visit        Additional Services     DENTAL REFERRAL       Consult for dental evaluation of temporomandibular joint disorder                  Who to contact     Please call your clinic at [phone] to:    Ask questions about your health    Make or cancel appointments    Discuss your medicines    Learn about your test results    Speak to your doctor            Additional Information About Your Visit        [employer] Information     [employer] gives you secure access to your electronic health record. If you see a primary care provider, you can also send messages to your care team and make appointments. If you have questions, please call your primary care clinic.  If you do not have a primary care provider, please call [phone] and they will assist you.      [employer] is an electronic gateway that provides easy, online access to your medical records. With [employer], you can request a clinic appointment, read your test results, renew a prescription or communicate with your care team.     To access your existing account, please contact your [facility] or call [phone] for assistance.        Care EveryWhere ID     This is your Care EveryWhere ID. This could be used by  other organizations to access your [place] medical records  [id number]         Blood Pressure from Last 3 Encounters:   07/11/18 96/63   05/30/18 97/63   03/09/15 114/76    Weight from Last 3 Encounters:   07/19/18 65.8 kg (145 lb)   07/11/18 66.2 kg (146 lb)   05/30/18 68.3 kg (150 lb 9.6 oz)              We Performed the Following     DENTAL REFERRAL        Primary Care Provider Office Phone # Fax #    Abisai Arrington [phone] [address] [facility] [address]        Equal Access to Services     [facility]: Hadii aad elise hadasho Sofransisca, waaxda luqadaha, qaybta kaalmada adejeremiahyada, rui ndiaye . So [facility] [phone].    ATENCIÓN: Si habla español, tiene a covarrubias disposición servicios gratuitos de asistencia lingüística. [facility] [phone].    We comply with applicable federal civil rights laws and [place] laws. We do not discriminate on the basis of race, color, national origin, age, disability, sex, sexual orientation, or gender identity.            Thank you!     Thank you for choosing [facility] EAR NOSE AND THROAT  for your care. Our goal is always to provide you with excellent care. Hearing back from our patients is one way we can continue to improve our services. Please take a few minutes to complete the written survey that you may receive in the mail after your visit with us. Thank you!             Your Updated Medication List - Protect others around you: Learn how to safely use, store and throw away your medicines at www.disposemymeds.org.          This list is accurate as of 9/20/18  1:37 PM.  Always use your most recent med list.                   Brand Name Dispense Instructions for use Diagnosis    cetirizine 10 MG tablet    zyrTEC     Take 10 mg by mouth daily as needed for allergies        fluticasone 50 MCG/ACT spray    FLONASE     Spray 1 spray into both nostrils daily        IBUPROFEN PO      Take 2 tablets by mouth as needed for  moderate pain        oxymetazoline 0.05 % spray    AFRIN    20 mL    Spray 2 sprays into left nostril 2 times daily    Acute serous otitis media of left ear, recurrence not specified

## 2018-10-11 ENCOUNTER — TRANSFERRED RECORDS (OUTPATIENT)
Dept: HEALTH INFORMATION MANAGEMENT | Facility: CLINIC | Age: 28
End: 2018-10-11

## 2019-05-14 ENCOUNTER — OFFICE VISIT (OUTPATIENT)
Dept: INTERNAL MEDICINE | Facility: CLINIC | Age: 29
End: 2019-05-14
Payer: COMMERCIAL

## 2019-05-14 VITALS
DIASTOLIC BLOOD PRESSURE: 68 MMHG | HEART RATE: 60 BPM | RESPIRATION RATE: 16 BRPM | SYSTOLIC BLOOD PRESSURE: 105 MMHG | BODY MASS INDEX: 19.76 KG/M2 | WEIGHT: 138 LBS

## 2019-05-14 DIAGNOSIS — R10.32 ABDOMINAL PAIN, LEFT LOWER QUADRANT: Primary | ICD-10-CM

## 2019-05-14 DIAGNOSIS — R10.32 ABDOMINAL PAIN, LEFT LOWER QUADRANT: ICD-10-CM

## 2019-05-14 LAB
ALBUMIN SERPL-MCNC: 4 G/DL (ref 3.4–5)
ALP SERPL-CCNC: 64 U/L (ref 40–150)
ALT SERPL W P-5'-P-CCNC: 16 U/L (ref 0–70)
ANION GAP SERPL CALCULATED.3IONS-SCNC: 4 MMOL/L (ref 3–14)
AST SERPL W P-5'-P-CCNC: 13 U/L (ref 0–45)
BASOPHILS # BLD AUTO: 0.1 10E9/L (ref 0–0.2)
BASOPHILS NFR BLD AUTO: 0.6 %
BILIRUB SERPL-MCNC: 0.5 MG/DL (ref 0.2–1.3)
BUN SERPL-MCNC: 8 MG/DL (ref 7–30)
CALCIUM SERPL-MCNC: 9.2 MG/DL (ref 8.5–10.1)
CHLORIDE SERPL-SCNC: 104 MMOL/L (ref 94–109)
CO2 SERPL-SCNC: 31 MMOL/L (ref 20–32)
CREAT SERPL-MCNC: 1.13 MG/DL (ref 0.66–1.25)
DIFFERENTIAL METHOD BLD: NORMAL
EOSINOPHIL # BLD AUTO: 0.2 10E9/L (ref 0–0.7)
EOSINOPHIL NFR BLD AUTO: 2.3 %
ERYTHROCYTE [DISTWIDTH] IN BLOOD BY AUTOMATED COUNT: 12.7 % (ref 10–15)
GFR SERPL CREATININE-BSD FRML MDRD: 87 ML/MIN/{1.73_M2}
GLUCOSE SERPL-MCNC: 87 MG/DL (ref 70–99)
HCT VFR BLD AUTO: 47.3 % (ref 40–53)
HGB BLD-MCNC: 15.4 G/DL (ref 13.3–17.7)
IMM GRANULOCYTES # BLD: 0.1 10E9/L (ref 0–0.4)
IMM GRANULOCYTES NFR BLD: 0.7 %
LYMPHOCYTES # BLD AUTO: 1.8 10E9/L (ref 0.8–5.3)
LYMPHOCYTES NFR BLD AUTO: 20.5 %
MCH RBC QN AUTO: 30.3 PG (ref 26.5–33)
MCHC RBC AUTO-ENTMCNC: 32.6 G/DL (ref 31.5–36.5)
MCV RBC AUTO: 93 FL (ref 78–100)
MONOCYTES # BLD AUTO: 0.7 10E9/L (ref 0–1.3)
MONOCYTES NFR BLD AUTO: 8.1 %
NEUTROPHILS # BLD AUTO: 6 10E9/L (ref 1.6–8.3)
NEUTROPHILS NFR BLD AUTO: 67.8 %
NRBC # BLD AUTO: 0 10*3/UL
NRBC BLD AUTO-RTO: 0 /100
PLATELET # BLD AUTO: 189 10E9/L (ref 150–450)
POTASSIUM SERPL-SCNC: 4.4 MMOL/L (ref 3.4–5.3)
PROT SERPL-MCNC: 7.7 G/DL (ref 6.8–8.8)
RBC # BLD AUTO: 5.09 10E12/L (ref 4.4–5.9)
SODIUM SERPL-SCNC: 139 MMOL/L (ref 133–144)
WBC # BLD AUTO: 8.8 10E9/L (ref 4–11)

## 2019-05-14 RX ORDER — FAMOTIDINE 10 MG
10 TABLET ORAL 2 TIMES DAILY
COMMUNITY
End: 2021-05-31

## 2019-05-14 ASSESSMENT — PAIN SCALES - GENERAL: PAINLEVEL: MILD PAIN (2)

## 2019-05-14 NOTE — PATIENT INSTRUCTIONS
Primary Care Center Phone Number [phone]  Primary Care Center Medication Refill Request Information:  * Please contact your pharmacy regarding ANY request for medication refills.  ** [facility] Prescription Fax = [phone]  * Please allow 3 business days for routine medication refills.  * Please allow 5 business days for controlled substance medication refills.     Primary Care Center Test Result notification information:  *You will be notified with in 7-10 days of your appointment day regarding the results of your test.  If you are on MyChart you will be notified as soon as the provider has reviewed the results and signed off on them.               [facility]         Internal Medicine Resident                   Continuity Clinic    Who We Are    Resident Continuity Clinic is a part of the [facility].  Resident physicians see patients independently and establish a relationship with them over the course of their three-year residency program.  As with the Primary Care Clinic, our Resident Continuity Clinic models a group practice.  If your doctor is not available, you will be able to see another resident physician.  At the end of a resident s training, patients will be transitioned to a new resident physician for ongoing care.     We treat patients with a wide array of medical needs from routine physicals, to acute illnesses, to diabetes and blood pressure management, to complex medical illness.  What is a Resident Physician?    Resident physicians hold medical degrees and are doctors. They are training to become specialists in Internal Medicine. They work under the supervision of board-certified faculty physicians.  Expectations for Your Care    We strive to provide accessible, quality care at all times.    In order to provide this care, it is best to see your primary care resident doctor consistently rather switching between providers.  In the event you do see another physician, you  should schedule a follow-up visit with your usual primary care doctor.    If you are transitioning your care from another clinic, it is helpful to have your records available for your doctor to review.    We do not prescribe controlled substances, such as ADD medications or narcotic pain medications, on your first visit.  We will review your health records and concerns prior to devising a treatment plan with you in order to provide the best care.      Clinic Services     Extended clinic hours; patient  to help navigate your visit;  parking; laboratory and imaging services with evening and weekend hours    Multiple medical and surgical specialties in one building    Complementary services, including Nutrition, Integrative Medicine, Pharmacy consultations, Mental and Behavioral Health, Sports Medicine and Physical Therapy    Thank You    We would like to thank you for choosing the [facility] Internal Medicine Resident Continuity Clinic for your primary care. You are making a priceless contribution to the training of the next generation of health care practitioners.     Contact us at [phone] for appointments or questions.    Resident Clinic Hours are Tuesdays and Thursdays, 7:30am-5:00pm    Residents   Valeriano Ny MD  (Male)   Elissa Cadet MD (Female)  Magda Sanchez MD   (Female)   Jeannie Castillo MD   (Female)   Bucky Jorgensen MD  (Male)   Leroy Maradiaga MD  (Male)   Itz Bone MD    (Female)   Anish Sawyer MD  (Male)   Roni Jarvis MD  (Male)    Wendy Payne MD  (Female)   Carlos Gutierrez MD  (Male)   Joanne Guadalupe MD  (Female)   William Parsons MD   (Female)   Haider Glez MD  (Male)   Rocky Stone MD  (Male)   Leroy Blanton MD (Male)   Noel Cobos MD  (Male)   Blanche Hernandez MD (Female)    Lindsay Chambers MD (Female)   Idalia Sun MD  (Male)   Gladys Mcneill MD    (Female)   Cher Dawkins MD  (Female)    Supervising  Physicians   MD Malena Blood MD Briar Duffy, MD Keren Lara MD Tanya Melnik, MD Charles Moldow, MD Heather Thompson Buum, MD Kathleen Watson, MD          Please go to the lab on the first floor before you leave today.     Imaging [phone] [facility] [address]  [phone] [facility]  [phone] [facility]

## 2019-05-14 NOTE — PROGRESS NOTES
[facility]         HPI:       Flip Lopez is a 29 year old male with prior history of recurrent tonsillitis who presents to clinic for: Abdominal Pain (pt is here to discuss abdominal pain x 2 weeks )    Patient notes that he has had left lower quadrant abdominal pain for the past 2 weeks.  Pain is described as crampy and sharp and stinging at times.  It is constant but the intensity will wax and wane.  Sometimes the pain will radiate as a band across his lower abdomen but it is mostly in the left lower quadrant initially this pain seemed to be a precursor of bowel movements.  Last week he had about 4 or 5 days of diarrhea.  He took Pepcid last week that did not completely resolve his symptoms.  He denies any recent diet changes.  He does note that he has a lot of stress recently related to a new job that he recently started, becoming engaged with his fiancée, and a new house that they moved into recently.  Patient denies weight loss, fever, dehydration, bright red blood per rectum, changes in appetite, nausea vomiting.  He also denies any muscle aches or joint pains.      PMHx:  Past Medical History:   Diagnosis Date     History of strep sore throat      Recurrent tonsillitis        PSHx:  Past Surgical History:   Procedure Laterality Date     PE TUBES       TONSILLECTOMY Bilateral 9/10/2014    Procedure: TONSILLECTOMY;  Surgeon: Heather Marie MD;  Location: [facility]         FamHx:  Family History   Problem Relation Age of Onset     Heart Disease Mother 45        stent in coronary artery     No Known Problems Father      No Known Problems Sister      LUNG DISEASE Maternal Grandmother      Heart Disease Maternal Grandfather         pacemaker     Coronary Artery Disease Maternal Uncle 55        stent in coronary artery   DM in father's side      Allergies:     Allergies   Allergen Reactions     Seasonal Allergies        Meds:    Current Outpatient Medications:       cetirizine (ZYRTEC) 10 MG tablet, Take 10 mg by mouth daily as needed for allergies, Disp: , Rfl:      famotidine (PEPCID) 10 MG tablet, Take 10 mg by mouth 2 times daily, Disp: , Rfl:      fluticasone (FLONASE) 50 MCG/ACT spray, Spray 1 spray into both nostrils daily, Disp: , Rfl:      IBUPROFEN PO, Take 2 tablets by mouth as needed for moderate pain, Disp: , Rfl:      oxymetazoline (AFRIN) 0.05 % spray, Spray 2 sprays into left nostril 2 times daily (Patient not taking: Reported on 5/14/2019), Disp: 20 mL, Rfl: 0    SocHx:  Social History     Socioeconomic History     Marital status: Single     Spouse name: Not on file     Number of children: Not on file     Years of education: Not on file     Highest education level: Not on file   Occupational History     Not on file   Social Needs     Financial resource strain: Not on file     Food insecurity:     Worry: Not on file     Inability: Not on file     Transportation needs:     Medical: Not on file     Non-medical: Not on file   Tobacco Use     Smoking status: Never Smoker     Smokeless tobacco: Never Used   Substance and Sexual Activity     Alcohol use: Yes     Comment: beer occasionally     Drug use: Yes     Types: Marijuana     Sexual activity: Yes     Partners: Female     Birth control/protection: Condom     Comment: condom   Lifestyle     Physical activity:     Days per week: Not on file     Minutes per session: Not on file     Stress: Not on file   Relationships     Social connections:     Talks on phone: Not on file     Gets together: Not on file     Attends Confucianist service: Not on file     Active member of club or organization: Not on file     Attends meetings of clubs or organizations: Not on file     Relationship status: Not on file     Intimate partner violence:     Fear of current or ex partner: Not on file     Emotionally abused: Not on file     Physically abused: Not on file     Forced sexual activity: Not on file   Other Topics Concern     Not on file    Social History Narrative      for [employer]    Single in relationship.       Problem, Medication and Allergy Lists were reviewed and are current.  Patient is a new patient to this clinic and so  I reviewed/updated the Past Medical History, the Family History and the Social History.          Review of Systems:   ROS  I have personally reviewed and updated the complete ROS on the day of the visit.           Physical Exam:   /68   Pulse 60   Resp 16   Wt 62.6 kg (138 lb)   BMI 19.76 kg/m    Body mass index is 19.76 kg/m .  Vitals were reviewed       GENERAL APPEARANCE: Well-groomed, thin, pale and almost ill-appearing white male who is alert A&Ox2 and in no distress     EYES: EOMI,  PERRL     HENT: Mouth without ulcers or lesions.      NECK: no adenopathy.     RESP: lungs clear to auscultation - no rales, rhonchi or wheezes     CV: regular rates and rhythm, normal S1 S2, no S3 or S4 and no murmur, click or rub     ABDOMEN:  soft, minimal tenderness to LLQ.  No HSM or masses and bowel sounds normal     MS: extremities normal- no gross deformities noted, no evidence of inflammation in joints, FROM in all extremities.     SKIN: no suspicious lesions or rashes     NEURO: Normal strength and tone, sensory exam grossly normal, mentation intact and speech normal     PSYCH: mentation appears normal. and affect normal/bright         Results:     Office Visit on 03/09/2015   Component Date Value Ref Range Status     Specimen Description 03/09/2015 Throat   Final     Rapid Strep A Screen 03/09/2015    Final                    Value:NEGATIVE: No Group A streptococcal antigen detected by immunoassay, await   culture report.       Micro Report Status 03/09/2015 FINAL 03/09/2015   Final     WBC 03/09/2015 8.9  4.0 - 11.0 10e9/L Final     RBC Count 03/09/2015 5.34  4.4 - 5.9 10e12/L Final     Hemoglobin 03/09/2015 16.1  13.3 - 17.7 g/dL Final     Hematocrit 03/09/2015 47.3  40.0 - 53.0 %  Final     MCV 03/09/2015 89  78 - 100 fl Final     MCH 03/09/2015 30.1  26.5 - 33.0 pg Final     MCHC 03/09/2015 34.0  31.5 - 36.5 g/dL Final     RDW 03/09/2015 13.1  10.0 - 15.0 % Final     Platelet Count 03/09/2015 166  150 - 450 10e9/L Final     Diff Method 03/09/2015 Automated Method   Final     % Neutrophils 03/09/2015 78.8  % Final     % Lymphocytes 03/09/2015 12.2  % Final     % Monocytes 03/09/2015 8.5  % Final     % Eosinophils 03/09/2015 0.1  % Final     % Basophils 03/09/2015 0.2  % Final     % Immature Granulocytes 03/09/2015 0.2  % Final     Absolute Neutrophil 03/09/2015 7.0  1.6 - 8.3 10e9/L Final     Absolute Lymphocytes 03/09/2015 1.1  0.8 - 5.3 10e9/L Final     Absolute Monocytes 03/09/2015 0.8  0.0 - 1.3 10e9/L Final     Absolute Eosinophils 03/09/2015 0.0  0.0 - 0.7 10e9/L Final     Absolute Basophils 03/09/2015 0.0  0.0 - 0.2 10e9/L Final     Abs Immature Granulocytes 03/09/2015 0.0  0 - 0.4 10e9/L Final     Mononucleosis Screen 03/09/2015 Negative  NEG Final     HIV Antigen Antibody Combo 03/09/2015   NR Final                    Value:Nonreactive   HIV-1 p24 Ag & HIV-1/HIV-2 Ab Not Detected       Sodium 03/09/2015 135  133 - 144 mmol/L Final     Potassium 03/09/2015 4.0  3.4 - 5.3 mmol/L Final     Chloride 03/09/2015 102  94 - 109 mmol/L Final     Carbon Dioxide 03/09/2015 28  20 - 32 mmol/L Final     Anion Gap 03/09/2015 5  3 - 14 mmol/L Final     Glucose 03/09/2015 84  70 - 99 mg/dL Final    Comment: Effective 7/30/2014, the reference range for this assay has changed to reflect   new instrumentation/methodology.       Urea Nitrogen 03/09/2015 8  7 - 30 mg/dL Final    Comment: Effective 7/30/2014, the reference range for this assay has changed to reflect   new instrumentation/methodology.       Creatinine 03/09/2015 1.05  0.66 - 1.25 mg/dL Final     GFR Estimate 03/09/2015 86  >60 mL/min/1.7m2 Final    Non  GFR Calc     GFR Estimate If Black 03/09/2015   >60 mL/min/1.7m2 Final                     Value:>90   GFR Calc       Calcium 03/09/2015 9.0  8.5 - 10.1 mg/dL Final    Comment: Effective 7/30/2014, the reference range for this assay has changed to reflect   new instrumentation/methodology.       Bilirubin Total 03/09/2015 0.6  0.2 - 1.3 mg/dL Final     Albumin 03/09/2015 4.2  3.4 - 5.0 g/dL Final     Protein Total 03/09/2015 8.4  6.8 - 8.8 g/dL Final     Alkaline Phosphatase 03/09/2015 74  40 - 150 U/L Final     ALT 03/09/2015 18  0 - 70 U/L Final     AST 03/09/2015 16  0 - 45 U/L Final     Specimen Description 03/09/2015 Throat   Final     Culture Micro 03/09/2015 Normal pascale   Final     Micro Report Status 03/09/2015 FINAL 03/11/2015   Final     IFC Specimen 03/09/2015 Blood   Final     CD3 Mature T 03/09/2015 72  49 - 84 % Final    Comment: Effective 12/08/2014, the reference range for this assay has changed to   reflect   new methodology.       CD4 [place] T 03/09/2015 32  28 - 63 % Final    Comment: Effective 12/08/2014, the reference range for this assay has changed to   reflect   new methodology.       CD8 Suppressor T 03/09/2015 31  10 - 40 % Final    Comment: Effective 12/08/2014, the reference range for this assay has changed to   reflect   new methodology.       CD19 B Cells 03/09/2015 16  6 - 27 % Final    Comment: Effective 12/08/2014, the reference range for this assay has changed to   reflect   new methodology.       CD16 + 56 Natural [place]iller Cells 03/09/2015 10  4 - 25 % Final    Comment: Effective 12/08/2014, the reference range for this assay has changed to   reflect   new methodology.       CD4:CD8 Ratio 03/09/2015 1.03* 1.40 - 2.60 Final    Comment: Effective 12/08/2014, the reference range for this assay has changed to   reflect   new methodology.       Absolute CD3 03/09/2015 844  603 - 2,990 cells/uL Final    Comment: Effective 12/08/2014, the reference range for this assay has changed to   reflect   new methodology.       Absolute CD4 03/09/2015  370* 441 - 2,156 cells/uL Final    Comment: Effective 12/08/2014, the reference range for this assay has changed to   reflect   new methodology.       Absolute CD8 03/09/2015 361  125 - 1,312 cells/uL Final    Comment: Effective 12/08/2014, the reference range for this assay has changed to   reflect   new methodology.       Absolute CD19 03/09/2015 181  107 - 698 cells/uL Final    Comment: Effective 12/08/2014, the reference range for this assay has changed to   reflect   new methodology.       Absolute CD16+56 03/09/2015 118  95 - 640 cells/uL Final    Comment: Effective 12/08/2014, the reference range for this assay has changed to   reflect   new methodology.       T Cell Subset Profile Interpretati* 03/09/2015 << Do Not Report >>   Final       Lab Results   Component Value Date    WBC 8.8 05/14/2019    HGB 15.4 05/14/2019    HCT 47.3 05/14/2019     05/14/2019     05/14/2019    POTASSIUM 4.4 05/14/2019    CHLORIDE 104 05/14/2019    CO2 31 05/14/2019    BUN 8 05/14/2019    CR 1.13 05/14/2019    GLC 87 05/14/2019    AST 13 05/14/2019    ALT 16 05/14/2019    ALKPHOS 64 05/14/2019    BILITOTAL 0.5 05/14/2019       Assessment and Plan     Flip Lopez is a 29 year old male with prior history of recurrent tonsillitis who was seen in clinic for subacute LLQ abdominal pain.    Diagnoses and all orders for this visit:    Abdominal pain, left lower quadrant  Differential diagnoses include enteritis / infectious diarrhea, IBS, diverticulitis or diverticulosis, ulcerative colitis, Crohn's disease, celiac disease, nodular lymphoid hyperplasia.  We did discuss HIV testing, although patient declined since he had been tested several years ago and was negative and does not feel that he is at high risk for HIV.  Patient was advised that enteritis is most likely and that his symptoms should improve over the next 5-7 days.  We discussed that if his symptoms persist or worsen, he can get a CT abdomen and pelvis,  which was ordered, to evaluate for diverticula or other inflammatory changes.  It is of note that patient has been seen in the past for persistent LAD and pharyngitis despite tonsillectomy and primary immunodeficiency was considered.  If his abdominal symptoms persist, an immunoglobulin panel should be checked for possible IgA deficiency, which could manifest with IBD, celiac or nodular lymphoid hyperplasia of the GI tract.  Will check BMP and CBC today to evaluate electrolytes and WBC.  -     Comprehensive metabolic panel; Future  -     CBC with platelets differential; Future  -     CT Abdomen/Pelvis w contrast; Future     Options for treatment and follow-up care were reviewed with the patient. Flip MICHAEL John engaged in the decision making process and verbalized understanding of the options discussed and agreed with the final plan.    Elias Sawyer, DO  May 14, 2019    Pt was seen and plan of care discussed with Dr. Galloway.   Mr Lopez was seen and examined with the resident Dr Sawyer ,I have reviewed his note and plan and I agree.  Antonio Galloway MD

## 2020-03-02 ENCOUNTER — HEALTH MAINTENANCE LETTER (OUTPATIENT)
Age: 30
End: 2020-03-02

## 2020-12-20 ENCOUNTER — HEALTH MAINTENANCE LETTER (OUTPATIENT)
Age: 30
End: 2020-12-20

## 2021-04-24 ENCOUNTER — HEALTH MAINTENANCE LETTER (OUTPATIENT)
Age: 31
End: 2021-04-24

## 2021-05-31 ENCOUNTER — E-VISIT (OUTPATIENT)
Dept: URGENT CARE | Facility: CLINIC | Age: 31
End: 2021-05-31
Payer: COMMERCIAL

## 2021-05-31 DIAGNOSIS — R30.0 DIFFICULT OR PAINFUL URINATION: Primary | ICD-10-CM

## 2021-05-31 PROCEDURE — 99207 PR NON-BILLABLE SERV PER CHARTING: CPT

## 2021-06-01 NOTE — PATIENT INSTRUCTIONS
Dear Flip Lopez,    We are sorry you are not feeling well. Based on the responses you provided, it is recommended that you be seen in-person with your primary care provider or in urgent care so we can better evaluate your symptoms. Please click here to find the nearest urgent care location to you.   You will not be charged for this Visit. Thank you for trusting us with your care.    Tata Reddy NP

## 2021-06-04 ENCOUNTER — OFFICE VISIT (OUTPATIENT)
Dept: UROLOGY | Facility: CLINIC | Age: 31
End: 2021-06-04
Payer: COMMERCIAL

## 2021-06-04 DIAGNOSIS — N46.9 MALE INFERTILITY: ICD-10-CM

## 2021-06-04 DIAGNOSIS — R39.9 LOWER URINARY TRACT SYMPTOMS: Primary | ICD-10-CM

## 2021-06-04 DIAGNOSIS — R10.32 BILATERAL GROIN PAIN: ICD-10-CM

## 2021-06-04 DIAGNOSIS — R10.31 BILATERAL GROIN PAIN: ICD-10-CM

## 2021-06-04 LAB
ALBUMIN UR-MCNC: NEGATIVE MG/DL
APPEARANCE UR: CLEAR
BILIRUB UR QL STRIP: NEGATIVE
COLOR UR AUTO: ABNORMAL
GLUCOSE UR STRIP-MCNC: NEGATIVE MG/DL
HGB UR QL STRIP: NEGATIVE
KETONES UR STRIP-MCNC: NEGATIVE MG/DL
LEUKOCYTE ESTERASE UR QL STRIP: NEGATIVE
NITRATE UR QL: NEGATIVE
PH UR STRIP: 6.5 PH (ref 5–7)
SOURCE: ABNORMAL
SP GR UR STRIP: 1 (ref 1–1.03)
UROBILINOGEN UR STRIP-MCNC: NORMAL MG/DL (ref 0–2)

## 2021-06-04 PROCEDURE — 81003 URINALYSIS AUTO W/O SCOPE: CPT | Performed by: PHYSICIAN ASSISTANT

## 2021-06-04 PROCEDURE — 99204 OFFICE O/P NEW MOD 45 MIN: CPT | Performed by: PHYSICIAN ASSISTANT

## 2021-06-04 NOTE — NURSING NOTE
Flip Lopez's goals for this visit include:   Chief Complaint   Patient presents with     New Patient     painful urination        He requests these members of his care team be copied on today's visit information:     PCP: Abisai Arrington    Referring Provider:  Cindy Carrizales PA-C  [address] [place] [id number]    There were no vitals taken for this visit.    Do you need any medication refills at today's visit?     Cindy Gibbs LPN on 6/4/2021 at 10:30 AM

## 2021-06-07 ENCOUNTER — RECORDS - HEALTHEAST (OUTPATIENT)
Dept: ADMINISTRATIVE | Facility: OTHER | Age: 31
End: 2021-06-07

## 2021-06-07 ENCOUNTER — AMBULATORY - HEALTHEAST (OUTPATIENT)
Dept: LAB | Facility: CLINIC | Age: 31
End: 2021-06-07

## 2021-06-07 DIAGNOSIS — N46.9 MALE INFERTILITY: ICD-10-CM

## 2021-06-08 ENCOUNTER — TRANSFERRED RECORDS (OUTPATIENT)
Dept: HEALTH INFORMATION MANAGEMENT | Facility: CLINIC | Age: 31
End: 2021-06-08

## 2021-06-08 ENCOUNTER — AMBULATORY - HEALTHEAST (OUTPATIENT)
Dept: LAB | Facility: CLINIC | Age: 31
End: 2021-06-08

## 2021-06-08 DIAGNOSIS — N46.9 MALE INFERTILITY: ICD-10-CM

## 2021-06-08 LAB
BUN SERPL-MCNC: 7 MG/DL (ref 8–22)
CREAT SERPL-MCNC: 1 MG/DL (ref 0.7–1.3)
FSH SERPL-ACNC: 4.4 MIU/ML (ref 0–12)
GFR SERPL CREATININE-BSD FRML MDRD: >60 ML/MIN/1.73M2
IGA SERPL-MCNC: 1237 MG/DL
IGA SERPL-MCNC: 262 MG/DL (ref 65–400)
IGM SERPL-MCNC: 45 MG/DL (ref 60–280)

## 2021-06-09 ENCOUNTER — OFFICE VISIT (OUTPATIENT)
Dept: UROLOGY | Facility: CLINIC | Age: 31
End: 2021-06-09
Payer: COMMERCIAL

## 2021-06-09 DIAGNOSIS — E29.1 TESTICULAR HYPOFUNCTION: ICD-10-CM

## 2021-06-09 DIAGNOSIS — R86.8 TERATOSPERMIA: Primary | ICD-10-CM

## 2021-06-09 DIAGNOSIS — Z31.41 FERTILITY TESTING: ICD-10-CM

## 2021-06-09 PROCEDURE — 99214 OFFICE O/P EST MOD 30 MIN: CPT | Performed by: UROLOGY

## 2021-06-09 NOTE — NURSING NOTE
Flip Lopez's goals for this visit include:   Chief Complaint   Patient presents with     New Patient     infertility        He requests these members of his care team be copied on today's visit information:     PCP: Abisai Arrington    Referring Provider:  No referring provider defined for this encounter.    There were no vitals taken for this visit.    Do you need any medication refills at today's visit?     Cindy Gibbs LPN on 6/9/2021 at 2:13 PM

## 2021-06-09 NOTE — PROGRESS NOTES
It was my pleasure to see Mr. Flip Lopez, a 31 year old male here in consultation today for fertility evaluation.  His spouse is Lorie Irene age 31 (6/26/89).    Follow-up visit from when he saw Cindy Carrizales, urology PA 5 days ago.  Recently had some penile pain that has improved greatly.    This couple has been attempting to conceive for the last 6-12 mos. They have no previous pregnancy together.  Pregnancies with other partners: none.  They have tried timed intercourse. They have not tried IUI or IVF.    Female factors suspected: HSG was OK.  Cycles are regular.    PAST MEDICAL HISTORY:    Past Medical History:   Diagnosis Date     History of strep sore throat      Recurrent tonsillitis     No chronic medical problems     PAST SURG HISTORY  Past Surgical History:   Procedure Laterality Date     PE TUBES       TONSILLECTOMY Bilateral 9/10/2014    Procedure: TONSILLECTOMY;  Surgeon: Heather Marie MD;  [facility]          Medications as of 6/9/2021:  Current Outpatient Medications   Medication Sig     cetirizine (ZYRTEC) 10 MG tablet Take 10 mg by mouth daily as needed for allergies     fluticasone (FLONASE) 50 MCG/ACT spray Spray 1 spray into both nostrils daily     IBUPROFEN PO Take 2 tablets by mouth as needed for moderate pain     No current facility-administered medications for this visit.         ALLERGY:     Allergies   Allergen Reactions     Seasonal Allergies        SOCIAL HISTORY:  . Occupation: marketing.  No alcohol abuse, tobacco use.   Social History     Tobacco Use     Smoking status: Never Smoker     Smokeless tobacco: Never Used   Substance Use Topics     Alcohol use: Yes     Comment: beer occasionally     Drug use: Yes     Types: Marijuana       FAMILY HISTORY:   Family History   Problem Relation Age of Onset     Heart Disease Mother 45        stent in coronary artery     No Known Problems Father      No Known Problems Sister      LUNG  DISEASE Maternal Grandmother      Heart Disease Maternal Grandfather         pacemaker     Coronary Artery Disease Maternal Uncle 55        stent in coronary artery       REVIEW OF SYSTEMS:  Significant for feeling well at present     Denies erectile dysfunction, ejaculatory problems, testicular pain. No vision or smell deficits, no chronic sinus or respiratory infections. No recent febrile illness, weight loss. No heat or cold intolerance, gynecomastia, or other endocrine complaints.    Otherwise, no constitutional, eye, ENT, heart, lung, GI , musculoskeletal, skin, neurologic, psychiatric, or hematologic complaints.    GONADOTOXIN EXPOSURE: + for marijuana. Otherwise negative for heat, chemicals, pesticides, heavy metals, chemotherapy or radiation.     GENERAL PHYSICAL EXAM  Constitutional: No acute distress. Well nourished.   PSYCH: normal mood and affect.  NEURO: normal gait, no focal deficits.   EYES: anicteric, EOMI, PERR  CARDIOPULMONARY: breathing non-labored, pulse regular, no peripheral edema.  GI: Abdomen soft, non-tender, no surgical scars, no organomegaly.  MUSCULOSKELETAL: normal limb proportions, no muscle wasting, no contractures.  SKIN: Normal virilized hair distribution, no lesions, warts or rashes over genitalia, abdomen extremities or face.  HEME/LYMPH: no ecchymosis, no lymphadenopathy in groin, no lymphedema.     EXAM:  Phallus circumcised, meatus adequate, no plaques palpated. No penis abnormality.    Left testis descended , size is 14 cc , consistency is slightly soft . No intra-testicular masses.   Right testis descended , size is 14 cc , consistency is slightly soft. No intra-testicular masses.   Epididymes present, non-tender, not enlarged.   Left cord: Vas present. Grade II  varicocele noted.  Right cord: Vas present. Small grade II varicocele noted.     Rectal exam deferred.     Semen Analysis 2/15/21:  3.2ml,  pH 7.6, 29.9M/cc, 50% progressive, 0.5% morphology (>4%), Total Motile  Count: 52.6 Million.       FSH 4.4 6/8/21  (1-12)    ASSESSMENT:    Fertility Testing.    Testicular hypofunction, teratospermia, in the setting of bilateral varicoceles.    Bilateral varicoceles noted    PLAN:    Check testosterone level- future    Repeat SA at his convenience     Discussed OTC supplements to consider taking    I will contact him with results and plan/options.    Discussed risks, benefits, and alternatives of varicocele repair, including various methods.  I counseled him extensively on the nature of varicoceles, and their possible effects on pain, fertility, and testosterone production.  I described surgery and embolization approaches, and the detailed risks of surgical repair.  These include damage to artery (ischemia), damage to lymphatics ( hydrocele), as well as risk of recurrence (~1%). Discussed that about 2/3rds of men see improved semen parameters after varicocele repair and that improvement takes at least 3 months to see.  Discussed that varicocele pain typically improves with repair, but in rare cases the testis can become sensitive after a surgical repair.     Discussed ART options.    Advised abstain from marijuana    Thank-you for allowing me to care for your patient.  Sincerely,    Bernabe Cowart MD      Additional Coding Information:    Problems:  3 -- one acute uncomplicated illness or injury    Data Reviewed  Review of the result(s) of each unique test - fsh and SA results as listed above.    Level of risk:  3 -- low risk (e.g., OTC medication or observation, minor surgery without risks)    Time spent:  31  minutes spent on the date of the encounter doing chart review, history and exam, documentation and further activities per the note

## 2021-06-09 NOTE — PATIENT INSTRUCTIONS
Refrain from sexual activity 7 days prior to semen analysis appointment.    [facility]  [facility]  [address]  [address] [place] [address]. [place] [address]  Appointments: [phone] (please call this # to schedule)

## 2021-06-10 LAB
SHBG SERPL-SCNC: 57 NMOL/L (ref 11–80)
TESTOST FREE SERPL-MCNC: 12.56 NG/DL (ref 4.7–24.4)
TESTOST SERPL-MCNC: 794 NG/DL (ref 240–950)

## 2021-06-16 LAB
MISCELLANEOUS TEST DEPT. - HE HISTORICAL: NORMAL
PERFORMING LAB: NORMAL
SPECIMEN STATUS: NORMAL
TEST NAME: NORMAL

## 2021-06-24 ENCOUNTER — TELEPHONE (OUTPATIENT)
Dept: FAMILY MEDICINE | Facility: CLINIC | Age: 31
End: 2021-06-24

## 2021-06-24 NOTE — TELEPHONE ENCOUNTER
June 24, 2021  I received lab results for test I did not order but on chart review may have been ordered by Dr Bernabe Cowart   ( 6/9/21 visit) and will be scanned in chart but I will document to expedite result update.  6/8/2021  Testosterone total 794 range 240-950.  Sex hormone binding globulin 57 range 11-80  Free testosterone calculation 12.56 range 4.7-24.4  Estradiol ultra sensitive 49 range 10-40 elevated  Report lab printed 6/16/21.  First reviewed 6/23/21 as I was out of office.   Dr Cowart to notify patient of results.  Abisai Arrington MD

## 2021-07-06 DIAGNOSIS — R86.8 TERATOSPERMIA: ICD-10-CM

## 2021-07-06 DIAGNOSIS — E29.1 TESTICULAR HYPOFUNCTION: ICD-10-CM

## 2021-07-06 DIAGNOSIS — Z31.41 FERTILITY TESTING: ICD-10-CM

## 2021-07-06 PROCEDURE — 89322 SEMEN ANAL STRICT CRITERIA: CPT

## 2021-07-07 LAB
ABNORMAL SPERM: 99 MORPHOLOGY
ABSTINENCE DAYS: 2 DAYS (ref 2–7)
AGGLUTINATION: NO YES/NO
ANALYSIS TEMP - CENTIGRADE: 23.5 CENTIGRADE
CELL FRAGMENTS: ABNORMAL %
COLLECTION METHOD: ABNORMAL
COLLECTION SITE: ABNORMAL
CONSENT TO RELEASE TO PARTNER: YES
HEAD DEFECT: 99
IMMATURE SPERM: ABNORMAL %
IMMOTILE: 17 %
LAB RECEIPT TIME: ABNORMAL
LIQUEFIED: YES YES/NO
MIDPIECE DEFECT: 29
NON-PROGRESSIVE MOTILITY: 4 %
NORMAL SPERM: 1 % NORMAL FORMS (ref 4–?)
PROGRESSIVE MOTILITY: 79 % (ref 32–?)
ROUND CELLS: 0.6 MILLION/ML (ref ?–2)
SPECIMEN CONCENTRATION: 10 MILLION/ML (ref 15–?)
SPECIMEN PH: 7.6 PH (ref 7.2–?)
SPECIMEN TYPE: ABNORMAL
SPECIMEN VOL UR: 1.3 ML (ref 1.5–?)
TAIL DEFECT: 16
TIME OF ANALYSIS: ABNORMAL
TOTAL NUMBER: 13 MILLION (ref 39–?)
TOTAL PROGRESSIVE MOTILE: 10 MILLION (ref 15.6–?)
VISCOUS: NO YES/NO
VITALITY: ABNORMAL % (ref 58–?)
WBC SPECIMEN: ABNORMAL %

## 2021-07-07 NOTE — RESULT ENCOUNTER NOTE
"Dear Flip,     Here are your recent results.     Semen analysis results were much worse on this specimen compared to the previous one.  Motility was normal, but count was low and % of sperm with strictly normal shape remains low.    Varicocele repair is your \"fixable\" male factor.  Blood hormones including testosterone were normal, so there is not a prescription medication that would be expected to help fertility.  Options from the female side would include IUI (intrauterine inseminations) or IVF.  I recommend abstaining from marijuana.    Please let us know if you have any questions, or if your are interested in scheduling varicocele repair.    Darline COLE"

## 2021-09-10 DIAGNOSIS — I86.1 BILATERAL VARICOCELES: Primary | ICD-10-CM

## 2021-09-10 DIAGNOSIS — E29.1 TESTICULAR HYPOFUNCTION: ICD-10-CM

## 2021-10-03 ENCOUNTER — HEALTH MAINTENANCE LETTER (OUTPATIENT)
Age: 31
End: 2021-10-03

## 2021-10-21 ENCOUNTER — TELEPHONE (OUTPATIENT)
Dept: UROLOGY | Facility: CLINIC | Age: 31
End: 2021-10-21

## 2021-10-21 PROBLEM — I86.1 BILATERAL VARICOCELES: Status: ACTIVE | Noted: 2021-10-21

## 2021-10-21 PROBLEM — E29.1 TESTICULAR HYPOFUNCTION: Status: ACTIVE | Noted: 2021-10-21

## 2021-10-21 NOTE — CONFIDENTIAL NOTE
Patient is scheduled for surgery with Dr. Cowart    Spoke with: Flip Hernández voice mail with: n/a    Date of Surgery: 12/14/21    Location: [facility]    H&P: Scheduled with [facility]    Pre-procedure COVID-19 Test: to be done within 4 days of surgery    Additional imaging/appointments: n/a    Surgery packet: to be mailed by 10/22/21     Additional comments: n/a

## 2021-11-15 ENCOUNTER — TELEPHONE (OUTPATIENT)
Dept: UROLOGY | Facility: CLINIC | Age: 31
End: 2021-11-15
Payer: COMMERCIAL

## 2021-11-15 NOTE — TELEPHONE ENCOUNTER
----- Message from Tasia Bishop RN sent at 11/4/2021  3:15 PM CDT -----  Regarding: FW: teaching    ----- Message -----  From: Rhonda Granado  Sent: 10/21/2021   5:00 PM CDT  To: Tasia Bishop RN  Subject: neptali Cowart    BILATERAL VARICOCELE REPAIR (Bilateral)     Surgery scheduled on 12/14/21 @ [facility] OR    Patient informed, packet to be mailed by 10/22/21, please call for teaching    Rodrigo Ellis

## 2021-11-15 NOTE — TELEPHONE ENCOUNTER
Sent Telespreet message to the patient to remind them of what is needed for the upcoming procedure with Dr. Cowart on 12/14/21.  Covid test is not scheduled.Urine is not needed for this procedure.    Tasia Bishop RN   Care Coordinator Urology

## 2021-11-18 DIAGNOSIS — Z11.59 ENCOUNTER FOR SCREENING FOR OTHER VIRAL DISEASES: ICD-10-CM

## 2021-12-05 ENCOUNTER — MYC MEDICAL ADVICE (OUTPATIENT)
Dept: SURGERY | Facility: AMBULATORY SURGERY CENTER | Age: 31
End: 2021-12-05
Payer: COMMERCIAL

## 2021-12-05 ASSESSMENT — ENCOUNTER SYMPTOMS
SINUS PAIN: 0
SMELL DISTURBANCE: 0
TROUBLE SWALLOWING: 0
TASTE DISTURBANCE: 0
SINUS CONGESTION: 0
NECK MASS: 0
HOARSE VOICE: 0
SORE THROAT: 0

## 2021-12-06 ENCOUNTER — OFFICE VISIT (OUTPATIENT)
Dept: FAMILY MEDICINE | Facility: CLINIC | Age: 31
End: 2021-12-06
Payer: COMMERCIAL

## 2021-12-06 VITALS
HEIGHT: 69 IN | RESPIRATION RATE: 16 BRPM | HEART RATE: 73 BPM | WEIGHT: 150 LBS | SYSTOLIC BLOOD PRESSURE: 101 MMHG | DIASTOLIC BLOOD PRESSURE: 66 MMHG | BODY MASS INDEX: 22.22 KG/M2 | OXYGEN SATURATION: 97 %

## 2021-12-06 DIAGNOSIS — I86.1 BILATERAL VARICOCELES: ICD-10-CM

## 2021-12-06 DIAGNOSIS — E29.1 TESTICULAR HYPOFUNCTION: ICD-10-CM

## 2021-12-06 DIAGNOSIS — Z01.818 PREOP GENERAL PHYSICAL EXAM: Primary | ICD-10-CM

## 2021-12-06 PROCEDURE — 99214 OFFICE O/P EST MOD 30 MIN: CPT | Performed by: FAMILY MEDICINE

## 2021-12-06 SDOH — ECONOMIC STABILITY: INCOME INSECURITY: IN THE LAST 12 MONTHS, WAS THERE A TIME WHEN YOU WERE NOT ABLE TO PAY THE MORTGAGE OR RENT ON TIME?: NO

## 2021-12-06 SDOH — SOCIAL STABILITY: SOCIAL INSECURITY: WITHIN THE LAST YEAR, HAVE YOU BEEN AFRAID OF YOUR PARTNER OR EX-PARTNER?: NO

## 2021-12-06 SDOH — ECONOMIC STABILITY: TRANSPORTATION INSECURITY
IN THE PAST 12 MONTHS, HAS LACK OF TRANSPORTATION KEPT YOU FROM MEETINGS, WORK, OR FROM GETTING THINGS NEEDED FOR DAILY LIVING?: NO

## 2021-12-06 SDOH — SOCIAL STABILITY: SOCIAL INSECURITY
WITHIN THE LAST YEAR, HAVE YOU BEEN KICKED, HIT, SLAPPED, OR OTHERWISE PHYSICALLY HURT BY YOUR PARTNER OR EX-PARTNER?: NO

## 2021-12-06 SDOH — SOCIAL STABILITY: SOCIAL INSECURITY
WITHIN THE LAST YEAR, HAVE TO BEEN RAPED OR FORCED TO HAVE ANY KIND OF SEXUAL ACTIVITY BY YOUR PARTNER OR EX-PARTNER?: NO

## 2021-12-06 SDOH — SOCIAL STABILITY: SOCIAL NETWORK: HOW OFTEN DO YOU ATTEND CHURCH OR RELIGIOUS SERVICES?: NEVER

## 2021-12-06 SDOH — SOCIAL STABILITY: SOCIAL NETWORK
IN A TYPICAL WEEK, HOW MANY TIMES DO YOU TALK ON THE PHONE WITH FAMILY, FRIENDS, OR NEIGHBORS?: MORE THAN THREE TIMES A WEEK

## 2021-12-06 SDOH — ECONOMIC STABILITY: INCOME INSECURITY: HOW HARD IS IT FOR YOU TO PAY FOR THE VERY BASICS LIKE FOOD, HOUSING, MEDICAL CARE, AND HEATING?: NOT HARD AT ALL

## 2021-12-06 SDOH — HEALTH STABILITY: MENTAL HEALTH: HOW OFTEN DO YOU HAVE 6 OR MORE DRINKS ON ONE OCCASION?: NEVER

## 2021-12-06 SDOH — SOCIAL STABILITY: SOCIAL NETWORK
DO YOU BELONG TO ANY CLUBS OR ORGANIZATIONS SUCH AS CHURCH GROUPS UNIONS, FRATERNAL OR ATHLETIC GROUPS, OR SCHOOL GROUPS?: NO

## 2021-12-06 SDOH — HEALTH STABILITY: PHYSICAL HEALTH: ON AVERAGE, HOW MANY DAYS PER WEEK DO YOU ENGAGE IN MODERATE TO STRENUOUS EXERCISE (LIKE A BRISK WALK)?: 7 DAYS

## 2021-12-06 SDOH — ECONOMIC STABILITY: TRANSPORTATION INSECURITY
IN THE PAST 12 MONTHS, HAS THE LACK OF TRANSPORTATION KEPT YOU FROM MEDICAL APPOINTMENTS OR FROM GETTING MEDICATIONS?: NO

## 2021-12-06 SDOH — ECONOMIC STABILITY: FOOD INSECURITY: WITHIN THE PAST 12 MONTHS, YOU WORRIED THAT YOUR FOOD WOULD RUN OUT BEFORE YOU GOT MONEY TO BUY MORE.: NEVER TRUE

## 2021-12-06 SDOH — ECONOMIC STABILITY: HOUSING INSECURITY
IN THE LAST 12 MONTHS, WAS THERE A TIME WHEN YOU DID NOT HAVE A STEADY PLACE TO SLEEP OR SLEPT IN A SHELTER (INCLUDING NOW)?: NO

## 2021-12-06 SDOH — HEALTH STABILITY: PHYSICAL HEALTH: ON AVERAGE, HOW MANY MINUTES DO YOU ENGAGE IN EXERCISE AT THIS LEVEL?: 30 MIN

## 2021-12-06 SDOH — HEALTH STABILITY: MENTAL HEALTH: HOW OFTEN DO YOU HAVE A DRINK CONTAINING ALCOHOL?: NEVER

## 2021-12-06 SDOH — ECONOMIC STABILITY: FOOD INSECURITY: WITHIN THE PAST 12 MONTHS, THE FOOD YOU BOUGHT JUST DIDN'T LAST AND YOU DIDN'T HAVE MONEY TO GET MORE.: NEVER TRUE

## 2021-12-06 SDOH — ECONOMIC STABILITY: HOUSING INSECURITY: IN THE LAST 12 MONTHS, HOW MANY PLACES HAVE YOU LIVED?: 1

## 2021-12-06 SDOH — SOCIAL STABILITY: SOCIAL INSECURITY: WITHIN THE LAST YEAR, HAVE YOU BEEN HUMILIATED OR EMOTIONALLY ABUSED IN OTHER WAYS BY YOUR PARTNER OR EX-PARTNER?: NO

## 2021-12-06 SDOH — HEALTH STABILITY: MENTAL HEALTH
STRESS IS WHEN SOMEONE FEELS TENSE, NERVOUS, ANXIOUS, OR CAN'T SLEEP AT NIGHT BECAUSE THEIR MIND IS TROUBLED. HOW STRESSED ARE YOU?: ONLY A LITTLE

## 2021-12-06 SDOH — SOCIAL STABILITY: SOCIAL NETWORK: ARE YOU MARRIED, WIDOWED, DIVORCED, SEPARATED, NEVER MARRIED, OR LIVING WITH A PARTNER?: MARRIED

## 2021-12-06 SDOH — SOCIAL STABILITY: SOCIAL NETWORK: HOW OFTEN DO YOU GET TOGETHER WITH FRIENDS OR RELATIVES?: MORE THAN THREE TIMES A WEEK

## 2021-12-06 ASSESSMENT — MIFFLIN-ST. JEOR: SCORE: 1625.78

## 2021-12-06 ASSESSMENT — PAIN SCALES - GENERAL: PAINLEVEL: NO PAIN (0)

## 2021-12-06 NOTE — PATIENT INSTRUCTIONS

## 2021-12-06 NOTE — NURSING NOTE
Flip Lopez is a 31 year old male patient that presents today in clinic for the following:    Chief Complaint   Patient presents with     Pre-Op Exam     No other specific concerns     The patient's allergies and medications were reviewed as noted. A set of vitals were recorded as noted without incident. The patient does not have any other questions for the provider.    Kristin Daily, EMT at 4:50 PM on 12/6/2021

## 2021-12-06 NOTE — H&P (VIEW-ONLY)
[facility] PRIMARY CARE CLINIC [address]  4TH FLOOR  [facility] [address]  Phone: [phone]  Fax: [phone]  Primary Provider: Abisai Arrington        PREOPERATIVE EVALUATION:  Today's date: 12/6/2021    Flip Lopez is a 31 year old male who presents for a preoperative evaluation.    Surgical Information:  Surgery/Procedure: BILATERAL VARICOCELE REPAIR  [place] [facility] [place]: Bernabe Cowart MD  Surgery Date: 12/14/2021  Time of Surgery: 8:00 am  Where patient plans to recover: At home with family  Fax number for surgical facility: Note does not need to be faxed, will be available electronically in Epic.    Type of Anesthesia Anticipated: General    Assessment & Plan     The proposed surgical procedure is considered LOW risk.    Preop general physical exam  Flip Lopez 31 very healthy presents for pre-operative exam    Bilateral varicoceles  Testicular hypofunction    Flip Lopez and his wife have been trying to conceive for 2 years, noted varicoceles, decreased testicular funciton  and he has noted low grade pain in the groin has bilateral varicoceles.Plan per Dr Cowart.         Risks and Recommendations:  The patient has the following additional risks and recommendations for perioperative complications:   - No identified additional risk factors other than previously addressed        RECOMMENDATION:  APPROVAL GIVEN to proceed with proposed procedure, without further diagnostic evaluation.  39 minutes spent on the date of the encounter doing chart review, history, exam, diagnostics review, documentation, counseling and coordination of cares as noted.        Subjective     HPI related to upcoming procedure: Flip Lopez and his wife have been trying to conceive for 2 years, noted varicoceles, decreased testicular funciton  and he has noted low grade pain in the groin has bilateral varicoceles. Pain is worse when riding bike,  notes mild discomfort. No difficulty with urination.    Preop Questions 12/5/2021   1. Have you ever had a heart attack or stroke? No   2. Have you ever had surgery on your heart or blood vessels, such as a stent placement, a coronary artery bypass, or surgery on an artery in your head, neck, heart, or legs? No   3. Do you have chest pain with activity? No   4. Do you have a history of  heart failure? No   5. Do you currently have a cold, bronchitis or symptoms of other infection? No   6. Do you have a cough, shortness of breath, or wheezing? No   7. Do you or anyone in your family have previous history of blood clots? No   8. Do you or does anyone in your family have a serious bleeding problem such as prolonged bleeding following surgeries or cuts? No   9. Have you ever had problems with anemia or been told to take iron pills? No   10. Have you had any abnormal blood loss such as black, tarry or bloody stools? No   11. Have you ever had a blood transfusion? No   12. Are you willing to have a blood transfusion if it is medically needed before, during, or after your surgery? Yes   13. Have you or any of your relatives ever had problems with anesthesia? No   14. Do you have sleep apnea, excessive snoring or daytime drowsiness? No   15. Do you have any artifical heart valves or other implanted medical devices like a pacemaker, defibrillator, or continuous glucose monitor? No   16. Do you have artificial joints? No   17. Are you allergic to latex? No       Health Care Directive:  Patient does not have a Health Care Directive or Living Will: Patient states has Advance Directive and will bring in a copy to clinic. His wife Roxanne would be decision maker    Preoperative Review of :   reviewed - no record of controlled substances prescribed.      Status of Chronic Conditions:   No significant chronic problems, seasonal allergies      Review of Systems   Answers for HPI/ROS submitted by the patient on  12/5/2021  General Symptoms: No  Skin Symptoms: No  HENT Symptoms: Yes tinnitis left ear  EYE SYMPTOMS: No  HEART SYMPTOMS: No  LUNG SYMPTOMS: No  INTESTINAL SYMPTOMS: No  URINARY SYMPTOMS: No  REPRODUCTIVE SYMPTOMS: No  SKELETAL SYMPTOMS: No  BLOOD SYMPTOMS: No  NERVOUS SYSTEM SYMPTOMS: No  MENTAL HEALTH SYMPTOMS: No  Congestion: No   Voice hoarseness: No  Tooth pain: No  Gum tenderness: No  Bleeding gums: No  Change in taste: No  Change in sense of smell: No  Dry mouth: No  Hearing aid used: No  Neck lump: No      Immunization History   Administered Date(s) Administered     COVID-19,PF,Pfizer (12+ Yrs) 04/01/2021, 04/01/2021, 04/29/2021, 04/29/2021, 11/18/2021     DTaP, Unspecified 07/11/2018     Influenza Vaccine IM > 6 months Valent IIV4 (Alfuria,Fluzone) 09/13/2021     TDAP Vaccine (Boostrix) 07/11/2018     Patient Active Problem List    Diagnosis Date Noted     Bilateral varicoceles 10/21/2021     Priority: Medium     Added automatically from request for surgery [id number]       Testicular hypofunction 10/21/2021     Priority: Medium     Added automatically from request for surgery [id number]        Past Medical History:   Diagnosis Date     Chronic tonsillitis 7/28/2014     History of strep sore throat      Recurrent tonsillitis      Streptococcal sore throat (PHARYNGITIS) 7/28/2014     Tonsillar abscess 7/28/2014     Past Surgical History:   Procedure Laterality Date     PE TUBES       TONSILLECTOMY Bilateral 9/10/2014    Procedure: TONSILLECTOMY;  Surgeon: Heather Marie MD;  Location: [facility]       Current Outpatient Medications   Medication Sig Dispense Refill     cetirizine (ZYRTEC) 10 MG tablet Take 10 mg by mouth daily as needed for allergies       fluticasone (FLONASE) 50 MCG/ACT spray Spray 1 spray into both nostrils daily       IBUPROFEN PO Take 2 tablets by mouth as needed for moderate pain         Allergies   Allergen Reactions     Seasonal Allergies         Social  "History     Tobacco Use     Smoking status: Never Smoker     Smokeless tobacco: Never Used   Substance Use Topics     Alcohol use: Yes     Comment: beer occasionally     Family History   Problem Relation Age of Onset     Heart Disease Mother 45        stent in coronary artery     No Known Problems Father      No Known Problems Sister      LUNG DISEASE Maternal Grandmother      Heart Disease Maternal Grandfather 65        pacemaker     Coronary Artery Disease Maternal Uncle 57        age 55 stent in coronary artery     History   Drug Use     Types: Marijuana         Objective     /66 (BP Location: Right arm, Patient Position: Sitting, Cuff Size: Adult Regular)   Pulse 73   Resp 16   Ht 1.753 m (5' 9\")   Wt 68 kg (150 lb)   SpO2 97%   BMI 22.15 kg/m      Physical Exam    GENERAL APPEARANCE: healthy, alert and no distress     EYES: EOMI,  PERRL     HENT: ear canals and TM's normal and mouth without ulcers or lesions     NECK: no adenopathy, no asymmetry, masses, or scars and thyroid normal to palpation     RESP: lungs clear to auscultation - no rales, rhonchi or wheezes     CV: regular rate and rhythm, normal S1 S2, no S3 or S4 and no murmur, click or rub     ABDOMEN:  soft, nontender, no HSM or masses and bowel sounds normal    per Dr Cowart     MS: extremities normal- no gross deformities noted, no evidence of inflammation in joints, FROM in all extremities.     SKIN: no suspicious lesions or rashes     NEURO: Normal strength and tone,  mentation intact and speech normal     PSYCH: mentation appears normal. and affect normal/bright     LYMPHATICS: No cervical adenopathy        Diagnostics: Not indicated    Revised Cardiac Risk Index (RCRI):  The patient has the following serious cardiovascular risks for perioperative complications:   - No serious cardiac risks = 0 points     RCRI Interpretation: 0 points: Class I (very low risk - 0.4% complication rate)           Signed Electronically by: Abisai SHEPHERD" MD Murphy  Copy of this evaluation report is provided to requesting physician.

## 2021-12-06 NOTE — PROGRESS NOTES
Surgeon (please enter first and last name): Bernabe Cowart MD  [place] [facility] [place]  Date of Surgery: 12/14/2021  Procedure: BILATERAL VARICOCELE REPAIR  History of reaction to anesthesia? No    Kristin Daily, EMT at 4:44 PM on 12/6/2021

## 2021-12-06 NOTE — PROGRESS NOTES
[facility] PRIMARY [facility]ARE CLINIC [address]  4TH FLOOR  [facility] [address]  Phone: [phone]  Fax: [phone]  Primary Provider: Abisai Arrington        PREOPERATIVE EVALUATION:  Today's date: 12/6/2021    Flip Lopez is a 31 year old male who presents for a preoperative evaluation.    Surgical Information:  Surgery/Procedure: BILATERAL VARICOCELE REPAIR  [place] [facility] [place]  Surgeon: Bernabe Cowart MD  Surgery Date: 12/14/2021  Time of Surgery: 8:00 am  Where patient plans to recover: At home with family  Fax number for surgical facility: Note does not need to be faxed, will be available electronically in Epic.    Type of Anesthesia Anticipated: General    Assessment & Plan     The proposed surgical procedure is considered LOW risk.    Preop general physical exam  Flip Lopez 31 very healthy presents for pre-operative exam    Bilateral varicoceles  Testicular hypofunction    Flip Lopez and his wife have been trying to conceive for 2 years, noted varicoceles, decreased testicular funciton  and he has noted low grade pain in the groin has bilateral varicoceles.Plan per Dr Cowart.         Risks and Recommendations:  The patient has the following additional risks and recommendations for perioperative complications:   - No identified additional risk factors other than previously addressed        RECOMMENDATION:  APPROVAL GIVEN to proceed with proposed procedure, without further diagnostic evaluation.  39 minutes spent on the date of the encounter doing chart review, history, exam, diagnostics review, documentation, counseling and coordination of cares as noted.        Subjective     HPI related to upcoming procedure: Flip Lopez and his wife have been trying to conceive for 2 years, noted varicoceles, decreased testicular funciton  and he has noted low grade pain in the groin has bilateral varicoceles. Pain is worse when riding bike,  notes mild discomfort. No difficulty with urination.    Preop Questions 12/5/2021   1. Have you ever had a heart attack or stroke? No   2. Have you ever had surgery on your heart or blood vessels, such as a stent placement, a coronary artery bypass, or surgery on an artery in your head, neck, heart, or legs? No   3. Do you have chest pain with activity? No   4. Do you have a history of  heart failure? No   5. Do you currently have a cold, bronchitis or symptoms of other infection? No   6. Do you have a cough, shortness of breath, or wheezing? No   7. Do you or anyone in your family have previous history of blood clots? No   8. Do you or does anyone in your family have a serious bleeding problem such as prolonged bleeding following surgeries or cuts? No   9. Have you ever had problems with anemia or been told to take iron pills? No   10. Have you had any abnormal blood loss such as black, tarry or bloody stools? No   11. Have you ever had a blood transfusion? No   12. Are you willing to have a blood transfusion if it is medically needed before, during, or after your surgery? Yes   13. Have you or any of your relatives ever had problems with anesthesia? No   14. Do you have sleep apnea, excessive snoring or daytime drowsiness? No   15. Do you have any artifical heart valves or other implanted medical devices like a pacemaker, defibrillator, or continuous glucose monitor? No   16. Do you have artificial joints? No   17. Are you allergic to latex? No       Health Care Directive:  Patient does not have a Health Care Directive or Living Will: Patient states has Advance Directive and will bring in a copy to clinic. His wife Roxanne would be decision maker    Preoperative Review of :   reviewed - no record of controlled substances prescribed.      Status of Chronic Conditions:   No significant chronic problems, seasonal allergies      Review of Systems   Answers for HPI/ROS submitted by the patient on  12/5/2021  General Symptoms: No  Skin Symptoms: No  HENT Symptoms: Yes tinnitis left ear  EYE SYMPTOMS: No  HEART SYMPTOMS: No  LUNG SYMPTOMS: No  INTESTINAL SYMPTOMS: No  URINARY SYMPTOMS: No  REPRODUCTIVE SYMPTOMS: No  SKELETAL SYMPTOMS: No  BLOOD SYMPTOMS: No  NERVOUS SYSTEM SYMPTOMS: No  MENTAL HEALTH SYMPTOMS: No  Congestion: No   Voice hoarseness: No  Tooth pain: No  Gum tenderness: No  Bleeding gums: No  Change in taste: No  Change in sense of smell: No  Dry mouth: No  Hearing aid used: No  Neck lump: No      Immunization History   Administered Date(s) Administered     COVID-19,PF,Pfizer (12+ Yrs) 04/01/2021, 04/01/2021, 04/29/2021, 04/29/2021, 11/18/2021     DTaP, Unspecified 07/11/2018     Influenza Vaccine IM > 6 months Valent IIV4 (Alfuria,Fluzone) 09/13/2021     TDAP Vaccine (Boostrix) 07/11/2018     Patient Active Problem List    Diagnosis Date Noted     Bilateral varicoceles 10/21/2021     Priority: Medium     Added automatically from request for surgery [id number]       Testicular hypofunction 10/21/2021     Priority: Medium     Added automatically from request for surgery [id number]        Past Medical History:   Diagnosis Date     Chronic tonsillitis 7/28/2014     History of strep sore throat      Recurrent tonsillitis      Streptococcal sore throat (PHARYNGITIS) 7/28/2014     Tonsillar abscess 7/28/2014     Past Surgical History:   Procedure Laterality Date     PE TUBES       TONSILLECTOMY Bilateral 9/10/2014    Procedure: TONSILLECTOMY;  Surgeon: Heather Marie MD;  Location: [facility]       Current Outpatient Medications   Medication Sig Dispense Refill     cetirizine (ZYRTEC) 10 MG tablet Take 10 mg by mouth daily as needed for allergies       fluticasone (FLONASE) 50 MCG/ACT spray Spray 1 spray into both nostrils daily       IBUPROFEN PO Take 2 tablets by mouth as needed for moderate pain         Allergies   Allergen Reactions     Seasonal Allergies         Social  "History     Tobacco Use     Smoking status: Never Smoker     Smokeless tobacco: Never Used   Substance Use Topics     Alcohol use: Yes     Comment: beer occasionally     Family History   Problem Relation Age of Onset     Heart Disease Mother 45        stent in coronary artery     No Known Problems Father      No Known Problems Sister      LUNG DISEASE Maternal Grandmother      Heart Disease Maternal Grandfather 65        pacemaker     Coronary Artery Disease Maternal Uncle 57        age 55 stent in coronary artery     History   Drug Use     Types: Marijuana         Objective     /66 (BP Location: Right arm, Patient Position: Sitting, Cuff Size: Adult Regular)   Pulse 73   Resp 16   Ht 1.753 m (5' 9\")   Wt 68 kg (150 lb)   SpO2 97%   BMI 22.15 kg/m      Physical Exam    GENERAL APPEARANCE: healthy, alert and no distress     EYES: EOMI,  PERRL     HENT: ear canals and TM's normal and mouth without ulcers or lesions     NECK: no adenopathy, no asymmetry, masses, or scars and thyroid normal to palpation     RESP: lungs clear to auscultation - no rales, rhonchi or wheezes     CV: regular rate and rhythm, normal S1 S2, no S3 or S4 and no murmur, click or rub     ABDOMEN:  soft, nontender, no HSM or masses and bowel sounds normal    per Dr Cowart     MS: extremities normal- no gross deformities noted, no evidence of inflammation in joints, FROM in all extremities.     SKIN: no suspicious lesions or rashes     NEURO: Normal strength and tone,  mentation intact and speech normal     PSYCH: mentation appears normal. and affect normal/bright     LYMPHATICS: No cervical adenopathy        Diagnostics: Not indicated    Revised Cardiac Risk Index (RCRI):  The patient has the following serious cardiovascular risks for perioperative complications:   - No serious cardiac risks = 0 points     RCRI Interpretation: 0 points: Class I (very low risk - 0.4% complication rate)           Signed Electronically by: Abisai SHEHPERD" MD Murphy  Copy of this evaluation report is provided to requesting physician.

## 2021-12-11 ENCOUNTER — LAB (OUTPATIENT)
Dept: LAB | Facility: CLINIC | Age: 31
End: 2021-12-11
Attending: UROLOGY
Payer: COMMERCIAL

## 2021-12-11 DIAGNOSIS — E29.1 TESTICULAR HYPOFUNCTION: ICD-10-CM

## 2021-12-11 DIAGNOSIS — Z31.41 FERTILITY TESTING: ICD-10-CM

## 2021-12-11 DIAGNOSIS — R86.8 TERATOSPERMIA: ICD-10-CM

## 2021-12-11 DIAGNOSIS — Z11.59 ENCOUNTER FOR SCREENING FOR OTHER VIRAL DISEASES: ICD-10-CM

## 2021-12-11 PROCEDURE — 36415 COLL VENOUS BLD VENIPUNCTURE: CPT | Performed by: PATHOLOGY

## 2021-12-11 PROCEDURE — U0005 INFEC AGEN DETEC AMPLI PROBE: HCPCS | Mod: 90 | Performed by: PATHOLOGY

## 2021-12-11 PROCEDURE — 84270 ASSAY OF SEX HORMONE GLOBUL: CPT | Mod: 90 | Performed by: PATHOLOGY

## 2021-12-11 PROCEDURE — U0003 INFECTIOUS AGENT DETECTION BY NUCLEIC ACID (DNA OR RNA); SEVERE ACUTE RESPIRATORY SYNDROME CORONAVIRUS 2 (SARS-COV-2) (CORONAVIRUS DISEASE [COVID-19]), AMPLIFIED PROBE TECHNIQUE, MAKING USE OF HIGH THROUGHPUT TECHNOLOGIES AS DESCRIBED BY CMS-2020-01-R: HCPCS | Mod: 90 | Performed by: PATHOLOGY

## 2021-12-11 PROCEDURE — 99000 SPECIMEN HANDLING OFFICE-LAB: CPT | Performed by: PATHOLOGY

## 2021-12-11 PROCEDURE — 84403 ASSAY OF TOTAL TESTOSTERONE: CPT | Mod: 90 | Performed by: PATHOLOGY

## 2021-12-12 LAB — SARS-COV-2 RNA RESP QL NAA+PROBE: NEGATIVE

## 2021-12-13 ENCOUNTER — ANESTHESIA EVENT (OUTPATIENT)
Dept: SURGERY | Facility: AMBULATORY SURGERY CENTER | Age: 31
End: 2021-12-13
Payer: COMMERCIAL

## 2021-12-13 LAB — SHBG SERPL-SCNC: 48 NMOL/L (ref 11–80)

## 2021-12-14 ENCOUNTER — ANESTHESIA (OUTPATIENT)
Dept: SURGERY | Facility: AMBULATORY SURGERY CENTER | Age: 31
End: 2021-12-14
Payer: COMMERCIAL

## 2021-12-14 ENCOUNTER — HOSPITAL ENCOUNTER (OUTPATIENT)
Facility: AMBULATORY SURGERY CENTER | Age: 31
End: 2021-12-14
Attending: UROLOGY
Payer: COMMERCIAL

## 2021-12-14 VITALS
OXYGEN SATURATION: 97 % | HEART RATE: 82 BPM | TEMPERATURE: 96.6 F | BODY MASS INDEX: 21 KG/M2 | WEIGHT: 150 LBS | RESPIRATION RATE: 12 BRPM | SYSTOLIC BLOOD PRESSURE: 115 MMHG | HEIGHT: 71 IN | DIASTOLIC BLOOD PRESSURE: 65 MMHG

## 2021-12-14 DIAGNOSIS — E29.1 TESTICULAR HYPOFUNCTION: ICD-10-CM

## 2021-12-14 DIAGNOSIS — I86.1 BILATERAL VARICOCELES: ICD-10-CM

## 2021-12-14 LAB
TESTOST FREE SERPL-MCNC: 10.03 NG/DL
TESTOST SERPL-MCNC: 592 NG/DL (ref 240–950)

## 2021-12-14 PROCEDURE — 55530 REVISE SPERMATIC CORD VEINS: CPT | Mod: 50 | Performed by: UROLOGY

## 2021-12-14 PROCEDURE — 55530 REVISE SPERMATIC CORD VEINS: CPT | Mod: RT

## 2021-12-14 RX ORDER — LIDOCAINE 40 MG/G
CREAM TOPICAL
Status: DISCONTINUED | OUTPATIENT
Start: 2021-12-14 | End: 2021-12-14 | Stop reason: HOSPADM

## 2021-12-14 RX ORDER — FENTANYL CITRATE 50 UG/ML
25 INJECTION, SOLUTION INTRAMUSCULAR; INTRAVENOUS EVERY 5 MIN PRN
Status: DISCONTINUED | OUTPATIENT
Start: 2021-12-14 | End: 2021-12-14 | Stop reason: HOSPADM

## 2021-12-14 RX ORDER — CEFAZOLIN SODIUM 2 G/50ML
2 SOLUTION INTRAVENOUS
Status: COMPLETED | OUTPATIENT
Start: 2021-12-14 | End: 2021-12-14

## 2021-12-14 RX ORDER — EPHEDRINE SULFATE 50 MG/ML
INJECTION, SOLUTION INTRAMUSCULAR; INTRAVENOUS; SUBCUTANEOUS PRN
Status: DISCONTINUED | OUTPATIENT
Start: 2021-12-14 | End: 2021-12-14

## 2021-12-14 RX ORDER — PROPOFOL 10 MG/ML
INJECTION, EMULSION INTRAVENOUS PRN
Status: DISCONTINUED | OUTPATIENT
Start: 2021-12-14 | End: 2021-12-14

## 2021-12-14 RX ORDER — HYDROMORPHONE HYDROCHLORIDE 1 MG/ML
0.2 INJECTION, SOLUTION INTRAMUSCULAR; INTRAVENOUS; SUBCUTANEOUS EVERY 5 MIN PRN
Status: DISCONTINUED | OUTPATIENT
Start: 2021-12-14 | End: 2021-12-14 | Stop reason: HOSPADM

## 2021-12-14 RX ORDER — ACETAMINOPHEN 325 MG/1
975 TABLET ORAL ONCE
Status: COMPLETED | OUTPATIENT
Start: 2021-12-14 | End: 2021-12-14

## 2021-12-14 RX ORDER — SODIUM CHLORIDE, SODIUM LACTATE, POTASSIUM CHLORIDE, CALCIUM CHLORIDE 600; 310; 30; 20 MG/100ML; MG/100ML; MG/100ML; MG/100ML
INJECTION, SOLUTION INTRAVENOUS CONTINUOUS
Status: DISCONTINUED | OUTPATIENT
Start: 2021-12-14 | End: 2021-12-15 | Stop reason: HOSPADM

## 2021-12-14 RX ORDER — ONDANSETRON 4 MG/1
4 TABLET, ORALLY DISINTEGRATING ORAL EVERY 30 MIN PRN
Status: DISCONTINUED | OUTPATIENT
Start: 2021-12-14 | End: 2021-12-15 | Stop reason: HOSPADM

## 2021-12-14 RX ORDER — LIDOCAINE HYDROCHLORIDE 20 MG/ML
INJECTION, SOLUTION INFILTRATION; PERINEURAL PRN
Status: DISCONTINUED | OUTPATIENT
Start: 2021-12-14 | End: 2021-12-14

## 2021-12-14 RX ORDER — PROPOFOL 10 MG/ML
INJECTION, EMULSION INTRAVENOUS CONTINUOUS PRN
Status: DISCONTINUED | OUTPATIENT
Start: 2021-12-14 | End: 2021-12-14

## 2021-12-14 RX ORDER — ONDANSETRON 2 MG/ML
4 INJECTION INTRAMUSCULAR; INTRAVENOUS EVERY 30 MIN PRN
Status: DISCONTINUED | OUTPATIENT
Start: 2021-12-14 | End: 2021-12-15 | Stop reason: HOSPADM

## 2021-12-14 RX ORDER — OXYCODONE HYDROCHLORIDE 5 MG/1
5 TABLET ORAL EVERY 4 HOURS PRN
Status: DISCONTINUED | OUTPATIENT
Start: 2021-12-14 | End: 2021-12-15 | Stop reason: HOSPADM

## 2021-12-14 RX ORDER — SODIUM CHLORIDE, SODIUM LACTATE, POTASSIUM CHLORIDE, CALCIUM CHLORIDE 600; 310; 30; 20 MG/100ML; MG/100ML; MG/100ML; MG/100ML
INJECTION, SOLUTION INTRAVENOUS CONTINUOUS
Status: DISCONTINUED | OUTPATIENT
Start: 2021-12-14 | End: 2021-12-14 | Stop reason: HOSPADM

## 2021-12-14 RX ORDER — GABAPENTIN 300 MG/1
300 CAPSULE ORAL
Status: COMPLETED | OUTPATIENT
Start: 2021-12-14 | End: 2021-12-14

## 2021-12-14 RX ORDER — BUPIVACAINE HYDROCHLORIDE 5 MG/ML
INJECTION, SOLUTION PERINEURAL PRN
Status: DISCONTINUED | OUTPATIENT
Start: 2021-12-14 | End: 2021-12-14 | Stop reason: HOSPADM

## 2021-12-14 RX ORDER — DEXAMETHASONE SODIUM PHOSPHATE 4 MG/ML
INJECTION, SOLUTION INTRA-ARTICULAR; INTRALESIONAL; INTRAMUSCULAR; INTRAVENOUS; SOFT TISSUE PRN
Status: DISCONTINUED | OUTPATIENT
Start: 2021-12-14 | End: 2021-12-14

## 2021-12-14 RX ORDER — OXYCODONE HYDROCHLORIDE 5 MG/1
5 TABLET ORAL EVERY 6 HOURS PRN
Qty: 12 TABLET | Refills: 0 | Status: SHIPPED | OUTPATIENT
Start: 2021-12-14 | End: 2021-12-17

## 2021-12-14 RX ORDER — CEFAZOLIN SODIUM 2 G/50ML
2 SOLUTION INTRAVENOUS SEE ADMIN INSTRUCTIONS
Status: DISCONTINUED | OUTPATIENT
Start: 2021-12-14 | End: 2021-12-14 | Stop reason: HOSPADM

## 2021-12-14 RX ORDER — MEPERIDINE HYDROCHLORIDE 25 MG/ML
12.5 INJECTION INTRAMUSCULAR; INTRAVENOUS; SUBCUTANEOUS
Status: DISCONTINUED | OUTPATIENT
Start: 2021-12-14 | End: 2021-12-15 | Stop reason: HOSPADM

## 2021-12-14 RX ORDER — FENTANYL CITRATE 50 UG/ML
25 INJECTION, SOLUTION INTRAMUSCULAR; INTRAVENOUS
Status: DISCONTINUED | OUTPATIENT
Start: 2021-12-14 | End: 2021-12-15 | Stop reason: HOSPADM

## 2021-12-14 RX ORDER — GLYCOPYRROLATE 0.2 MG/ML
INJECTION, SOLUTION INTRAMUSCULAR; INTRAVENOUS PRN
Status: DISCONTINUED | OUTPATIENT
Start: 2021-12-14 | End: 2021-12-14

## 2021-12-14 RX ORDER — ONDANSETRON 2 MG/ML
INJECTION INTRAMUSCULAR; INTRAVENOUS PRN
Status: DISCONTINUED | OUTPATIENT
Start: 2021-12-14 | End: 2021-12-14

## 2021-12-14 RX ORDER — FENTANYL CITRATE 50 UG/ML
INJECTION, SOLUTION INTRAMUSCULAR; INTRAVENOUS PRN
Status: DISCONTINUED | OUTPATIENT
Start: 2021-12-14 | End: 2021-12-14

## 2021-12-14 RX ORDER — KETOROLAC TROMETHAMINE 30 MG/ML
INJECTION, SOLUTION INTRAMUSCULAR; INTRAVENOUS PRN
Status: DISCONTINUED | OUTPATIENT
Start: 2021-12-14 | End: 2021-12-14

## 2021-12-14 RX ADMIN — CEFAZOLIN SODIUM 2 G: 2 SOLUTION INTRAVENOUS at 07:59

## 2021-12-14 RX ADMIN — KETOROLAC TROMETHAMINE 30 MG: 30 INJECTION, SOLUTION INTRAMUSCULAR; INTRAVENOUS at 11:28

## 2021-12-14 RX ADMIN — SODIUM CHLORIDE, SODIUM LACTATE, POTASSIUM CHLORIDE, CALCIUM CHLORIDE: 600; 310; 30; 20 INJECTION, SOLUTION INTRAVENOUS at 07:12

## 2021-12-14 RX ADMIN — Medication 0.5 MG: at 11:16

## 2021-12-14 RX ADMIN — GLYCOPYRROLATE 0.2 MG: 0.2 INJECTION, SOLUTION INTRAMUSCULAR; INTRAVENOUS at 07:56

## 2021-12-14 RX ADMIN — DEXAMETHASONE SODIUM PHOSPHATE 4 MG: 4 INJECTION, SOLUTION INTRA-ARTICULAR; INTRALESIONAL; INTRAMUSCULAR; INTRAVENOUS; SOFT TISSUE at 08:13

## 2021-12-14 RX ADMIN — ONDANSETRON 4 MG: 2 INJECTION INTRAMUSCULAR; INTRAVENOUS at 13:11

## 2021-12-14 RX ADMIN — ONDANSETRON 4 MG: 2 INJECTION INTRAMUSCULAR; INTRAVENOUS at 08:13

## 2021-12-14 RX ADMIN — PROPOFOL 200 MG: 10 INJECTION, EMULSION INTRAVENOUS at 08:05

## 2021-12-14 RX ADMIN — FENTANYL CITRATE 100 MCG: 50 INJECTION, SOLUTION INTRAMUSCULAR; INTRAVENOUS at 08:04

## 2021-12-14 RX ADMIN — EPHEDRINE SULFATE 5 MG: 50 INJECTION, SOLUTION INTRAMUSCULAR; INTRAVENOUS; SUBCUTANEOUS at 08:24

## 2021-12-14 RX ADMIN — LIDOCAINE HYDROCHLORIDE 100 MG: 20 INJECTION, SOLUTION INFILTRATION; PERINEURAL at 08:04

## 2021-12-14 RX ADMIN — PROPOFOL 200 MCG/KG/MIN: 10 INJECTION, EMULSION INTRAVENOUS at 08:05

## 2021-12-14 RX ADMIN — EPHEDRINE SULFATE 5 MG: 50 INJECTION, SOLUTION INTRAMUSCULAR; INTRAVENOUS; SUBCUTANEOUS at 08:42

## 2021-12-14 RX ADMIN — EPHEDRINE SULFATE 5 MG: 50 INJECTION, SOLUTION INTRAMUSCULAR; INTRAVENOUS; SUBCUTANEOUS at 08:30

## 2021-12-14 RX ADMIN — GABAPENTIN 300 MG: 300 CAPSULE ORAL at 07:10

## 2021-12-14 RX ADMIN — ACETAMINOPHEN 975 MG: 325 TABLET ORAL at 07:10

## 2021-12-14 ASSESSMENT — MIFFLIN-ST. JEOR: SCORE: 1649.59

## 2021-12-14 NOTE — ANESTHESIA POSTPROCEDURE EVALUATION
Patient: Flip Lopez    Procedure: Procedure(s):  BILATERAL VARICOCELE REPAIR       Diagnosis:Bilateral varicoceles [I86.1]  Testicular hypofunction [E29.1]  Diagnosis Additional Information: No value filed.    Anesthesia Type:  General    Note:  Disposition: Outpatient   Postop Pain Control: Uneventful            Sign Out: Well controlled pain   PONV: Yes            Symptoms: Nausea only            Sign Out: Ongoing Nausea   Neuro/Psych: Uneventful            Sign Out: Acceptable/Baseline neuro status   Airway/Respiratory: Uneventful            Sign Out: Acceptable/Baseline resp. status   CV/Hemodynamics: Uneventful            Sign Out: Acceptable CV status; No obvious hypovolemia; No obvious fluid overload   Other NRE: NONE   DID A NON-ROUTINE EVENT OCCUR? No           Last vitals:  Vitals Value Taken Time   BP 87/64 12/14/21 1230   Temp 35.9  C (96.6  F) 12/14/21 1230   Pulse 79 12/14/21 1230   Resp 6 12/14/21 1230   SpO2 97 % 12/14/21 1230   Vitals shown include unvalidated device data.    Electronically Signed By: Abner Uribe MD  December 14, 2021  4:44 PM

## 2021-12-14 NOTE — OP NOTE
OPERATIVE REPORT    PREOPERATIVE DIAGNOSIS: Bilateral varicocele(s). Right grade II;  Left grade II  POSTOPERATIVE DIAGNOSIS: Same as above    PROCEDURE PERFORMED: Bilateral varicocelectomy using microscope.     STAFF SURGEON: Bernabe Cowart MD    RESIDENT SURGEON:  Josiah Floyd MD    ANESTHESIA: General endotracheal.     ESTIMATED BLOOD LOSS: 1 mL.    SPECIMENS: None.     COMPLICATIONS: None apparent     SIGNIFICANT FINDINGS:   Left side: Spared 4 arteries, 6 lymphatic channels  Right side: Spared 2 arteries, 4 lymphatic channels  Vas and vasal vessels preserved bilaterally.    INDICATIONS FOR PROCEDURE: Flip Lopez is a(n) 31 year old gentleman who presented for evaluation of infertility. He was found to have an abnormal semen analysis and bilateral varicoceles. and has elected for repair.  The risks, benefits, alternatives were discussed with the patient and he was consented on an elective basis to have this done.     DESCRIPTION OF PROCEDURE: After obtaining informed consent, properly marking and identifying the patient in the preoperative area, he was brought to the operating room, placed on the operating room table in the supine position. General anesthesia was obtained. Pneumoboots and preoperative antibiotics were administered. The patient was subsequently shaved, prepped and draped in the standard sterile fashion. A timeout was performed verifying the patient and procedure prior to beginning.   We began the procedure started by marking 3 cm subinguinal incision on the right side below the external ring. Injection of 0.5% Marcaine was used for local anesthesia before incision.  Electrocautery was used to carry dissection down to otto's fascia which was subsequently opened bluntly using the Metzenbaum scissors exposing the cord. Blunt dissection was continued proximally and distally to free up the cord and bring it up to the incision. The prepubic area and tissue surrounding the spermatic cord were  examined carefully looking for any additional vessels or veins. None were found and a penrose drain was placed to loosely control the right cord. The above incision was then repeated on the left, and the left cord was suspended over an [facility]-navy retractor. The Microscope was then brought into the field.    Spermatic fascia and cremasteric muscle fibers were opened using blunt dissection and electrocautery in the direction of the cord. Bundles of the cremasteric fibers were isolated laterally and medially with vessel loops and were pulled on the respective directions to aid in retraction of the cord and expose the vascular elements. Next, the vas deferens and its associated vessels and lymphatics were identified and preserved and again marked using a vessel loop. We then used a Doppler, 20 Hz probe to identify areas of arterial flow. Within the cord structures themselves, we were able to identify and preserve 4 arteries.  We also successfully identified 6 lymphatic channels that were preserved.  The remaining veins within the cord were ligated using 2-0 and 4-0 silk ties. The cremasteric muscle fibers were subsequently also ligated and divided using 2-0 silk ties. Of note, one section of cremasteric muscle fibers was doubly ligated but not ultimately divided in order to support the testicle. Following this, hemostasis was obtained using bipolar electrocautery and excellent hemostasis was noted. The cord was injected with 0.5% Marcaine and placed back into prepubic area.   A rushing catheter was placed, and the procedure as stated above was then repeated on the right side. Within the cord structures themselves, we were able to identify and preserve 2 robust arteries.  We also successfully identified 4 lymphatic channels that were preserved.    Vas deferens and vasal vessels with vasal artery were preserved bilaterally.  After injection of additional 0.5% Marcaine the incisions were closed beginning with closure of  Abeba's fascia in a deep dermal fashion using interrupted 3-0 chromic sutures. The skin was then closed with a 4-0 Monocryl in a running subcuticular fashion. Steri-Strips were placed. Primapore dressings applied. The patient was awakened from anesthesia and brought to the PACU in stable condition. The patient tolerated the procedure well and no intraoperative complications were noted.       I was present and scrubbed for the entire procedure.  Bernabe Cowart MD  Urology Staff

## 2021-12-14 NOTE — BRIEF OP NOTE
[facility]    Brief Operative Note    Pre-operative diagnosis: Bilateral varicoceles [I86.1]  Testicular hypofunction [E29.1]  Post-operative diagnosis Same as pre-operative diagnosis    Procedure: Procedure(s):  BILATERAL VARICOCELE REPAIR  Surgeon: Surgeon(s) and Role:     * Bernabe Cowart MD - Primary  Anesthesia: General   Estimated Blood Loss: Minimal    Drains: None  Specimens: * No specimens in log *  Findings:   4 arteries, 6 lymphatics spared on the left. 2 arteries, 4 lymphatics spared on the right. .  Complications: None.  Implants: * No implants in log *

## 2021-12-14 NOTE — ANESTHESIA CARE TRANSFER NOTE
Patient: Flip Lopez    Procedure: Procedure(s):  BILATERAL VARICOCELE REPAIR       Diagnosis: Bilateral varicoceles [I86.1]  Testicular hypofunction [E29.1]  Diagnosis Additional Information: No value filed.    Anesthesia Type:   No value filed.     Note:    Oropharynx: oropharynx clear of all foreign objects  Level of Consciousness: awake  Oxygen Supplementation: nasal cannula  Level of Supplemental Oxygen (L/min / FiO2): 2  Independent Airway: airway patency satisfactory and stable  Dentition: dentition unchanged  Vital Signs Stable: post-procedure vital signs reviewed and stable  Report to RN Given: handoff report given  Patient transferred to: PACU  Comments: VSS and WNL, comfortable, no PONV, report to Lucille RUBIO  Handoff Report: Identifed the Patient, Identified the Reponsible Provider, Reviewed the pertinent medical history, Discussed the surgical course, Reviewed Intra-OP anesthesia mangement and issues during anesthesia, Set expectations for post-procedure period and Allowed opportunity for questions and acknowledgement of understanding      Vitals:  Vitals Value Taken Time   BP 91/47 12/14/21 1152   Temp     Pulse 77 12/14/21 1157   Resp 14 12/14/21 1157   SpO2 98 % 12/14/21 1157   Vitals shown include unvalidated device data.    Electronically Signed By: JETT Aly CRNA  December 14, 2021  11:58 AM

## 2021-12-14 NOTE — ANESTHESIA PREPROCEDURE EVALUATION
Anesthesia Pre-Procedure Evaluation    Patient: Flip Lopez   MRN: [id number] : 1990        Preoperative Diagnosis: Bilateral varicoceles [I86.1]  Testicular hypofunction [E29.1]    Procedure : Procedure(s):  BILATERAL VARICOCELE REPAIR          Past Medical History:   Diagnosis Date     Chronic tonsillitis 2014     History of strep sore throat      Recurrent tonsillitis      Streptococcal sore throat (PHARYNGITIS) 2014     Tonsillar abscess 2014      Past Surgical History:   Procedure Laterality Date     PE TUBES       TONSILLECTOMY Bilateral 9/10/2014    Procedure: TONSILLECTOMY;  Surgeon: Heather Marie MD;  Location: [facility]        Allergies   Allergen Reactions     Seasonal Allergies       Social History     Tobacco Use     Smoking status: Never Smoker     Smokeless tobacco: Never Used   Substance Use Topics     Alcohol use: Yes     Comment: beer occasionally      Wt Readings from Last 1 Encounters:   21 68 kg (150 lb)        Anesthesia Evaluation            ROS/MED HX  ENT/Pulmonary:  - neg pulmonary ROS     Neurologic:  - neg neurologic ROS     Cardiovascular:  - neg cardiovascular ROS     METS/Exercise Tolerance:     Hematologic:  - neg hematologic  ROS     Musculoskeletal:  - neg musculoskeletal ROS     GI/Hepatic:  - neg GI/hepatic ROS     Renal/Genitourinary:  - neg Renal ROS     Endo:  - neg endo ROS     Psychiatric/Substance Use:  - neg psychiatric ROS     Infectious Disease:  - neg infectious disease ROS     Malignancy:  - neg malignancy ROS     Other:  - neg other ROS          Physical Exam    Airway  airway exam normal           Respiratory Devices and Support         Dental  no notable dental history         Cardiovascular   cardiovascular exam normal          Pulmonary   pulmonary exam normal                OUTSIDE LABS:  CBC:   Lab Results   Component Value Date    WBC 8.8 2019    WBC 8.9 2015    HGB 15.4 2019     HGB 16.1 03/09/2015    HCT 47.3 05/14/2019    HCT 47.3 03/09/2015     05/14/2019     03/09/2015     BMP:   Lab Results   Component Value Date     05/14/2019     03/09/2015    POTASSIUM 4.4 05/14/2019    POTASSIUM 4.0 03/09/2015    CHLORIDE 104 05/14/2019    CHLORIDE 102 03/09/2015    CO2 31 05/14/2019    CO2 28 03/09/2015    BUN 7 (L) 06/08/2021    BUN 8 05/14/2019    CR 1.00 06/08/2021    CR 1.13 05/14/2019    GLC 87 05/14/2019    GLC 84 03/09/2015     COAGS: No results found for: PTT, INR, FIBR  POC: No results found for: BGM, HCG, HCGS  HEPATIC:   Lab Results   Component Value Date    ALBUMIN 4.0 05/14/2019    PROTTOTAL 7.7 05/14/2019    ALT 16 05/14/2019    AST 13 05/14/2019    ALKPHOS 64 05/14/2019    BILITOTAL 0.5 05/14/2019     OTHER:   Lab Results   Component Value Date    LEATHA 9.2 05/14/2019       Anesthesia Plan    ASA Status:  1   NPO Status:  NPO Appropriate    Anesthesia Type: General.     - Airway: LMA   Induction: Intravenous.   Maintenance: Balanced.        Consents    Anesthesia Plan(s) and associated risks, benefits, and realistic alternatives discussed. Questions answered and patient/representative(s) expressed understanding.    - Discussed:     - Discussed with:  Patient         Postoperative Care    Pain management: IV analgesics, Oral pain medications, Multi-modal analgesia.   PONV prophylaxis: Ondansetron (or other 5HT-3), Dexamethasone or Solumedrol, Background Propofol Infusion     Comments:                Abner Uribe MD

## 2021-12-15 NOTE — RESULT ENCOUNTER NOTE
Dear Flip,     Here are your recent results.   Testosterone recheck was within normal limits.  The variation from the previous result is typical, I don't think your testosterone level is decreasing, this is normal fluctuation.    Please let us know if you have any questions,    Darline COLE

## 2021-12-28 ENCOUNTER — E-VISIT (OUTPATIENT)
Dept: URGENT CARE | Facility: URGENT CARE | Age: 31
End: 2021-12-28
Payer: COMMERCIAL

## 2021-12-28 DIAGNOSIS — M79.621 PAIN OF RIGHT UPPER ARM: Primary | ICD-10-CM

## 2021-12-28 NOTE — PATIENT INSTRUCTIONS
Dear Flip Lopez,    We are sorry you are not feeling well. Based on the responses you provided, it is recommended that you be seen in-person in urgent care so we can better evaluate your symptoms. Please click here to find the nearest urgent care location to you.   You will not be charged for this Visit. Thank you for trusting us with your care.    JETT Mccarty CNP

## 2022-01-12 ENCOUNTER — OFFICE VISIT (OUTPATIENT)
Dept: FAMILY MEDICINE | Facility: CLINIC | Age: 32
End: 2022-01-12
Payer: COMMERCIAL

## 2022-01-12 VITALS
DIASTOLIC BLOOD PRESSURE: 70 MMHG | HEART RATE: 84 BPM | TEMPERATURE: 97.9 F | BODY MASS INDEX: 21.22 KG/M2 | SYSTOLIC BLOOD PRESSURE: 105 MMHG | RESPIRATION RATE: 16 BRPM | OXYGEN SATURATION: 97 % | HEIGHT: 71 IN

## 2022-01-12 DIAGNOSIS — R20.2 NUMBNESS AND TINGLING IN RIGHT HAND: ICD-10-CM

## 2022-01-12 DIAGNOSIS — R20.0 NUMBNESS AND TINGLING IN RIGHT HAND: ICD-10-CM

## 2022-01-12 DIAGNOSIS — G56.31 RADIAL NEUROPATHY, RIGHT: Primary | ICD-10-CM

## 2022-01-12 DIAGNOSIS — M79.18 BRACHIORADIALIS MUSCLE TENDERNESS: ICD-10-CM

## 2022-01-12 PROCEDURE — 99215 OFFICE O/P EST HI 40 MIN: CPT | Performed by: FAMILY MEDICINE

## 2022-01-12 ASSESSMENT — PAIN SCALES - GENERAL: PAINLEVEL: NO PAIN (0)

## 2022-01-12 NOTE — NURSING NOTE
"Chief Complaint   Patient presents with     Numbness     Numbness/tingling sensation in right arm, primarily index finger and thumb; like they have \"fallen asleep\" per patient; occasional shooting pain     Musculoskeletal Problem       FISH Garber at 10:08 AM on 1/12/2022.   "

## 2022-01-12 NOTE — PROGRESS NOTES
Assessment & Plan   Flip 31-year-old with recent varicocele surgery in December recovering well presents today for onset of numbness and tingling in the right arm C5-6 distribution right thumb index finger and previous discomfort in the right axilla now improved with pain in the right brachioradialis absent biceps and brachial radialis reflex on the right arm, no weakness in the right arm.  Radial neuropathy, right  Numbness and tingling in right hand  Brachioradialis muscle tenderness  Reviewed neck step is to check a cervical spine and EMG, I suspect a positional radial neuropathy which may improve within a few weeks.  He will monitor and notify me if his symptoms are worsening.  We tried to obtain cervical spine films today however I see EMG and cervical spine were ordered for mid February.  - EMG  - XR Cervical Spine 2-3 Views   40 minutes spent on the date of the encounter doing chart review, history, exam, diagnostics review, documentation, counseling and coordination of cares as noted.      Return in about 4-6 weeks (around 2/9/2022) for follow-up of test results to review next steps, earlier concerns.    Abisai Arrington MD  [facility] [place]    Tony Castelan is a 31 year old who presents for the following health issues     HPI   Flip Lopez presents for right hand thumb and index finger tingling, right brachioradialis tenderness and pain in right trapezius.  Flip Lopez and his wife have been trying to conceive for 2 years, noted varicoceles, decreased testicular funciton and noted low grade pain in the groin, bilateral varicoceles. He had a surgical varicocele  procedure 12/14/2021 reviewed anesthesia fentanyl propofol. He feels he is recovering from the procedure well. He noted back pain while recovery from surgery as he was lying in bed for a few days.  He noted slight pain with deep breath which resolved after he increased his activity. 12/20  "noted tingling in the right arm and pit of the right axilla. He denied trauma and is uncertain of the position he was in during the surgery.  He noted tightness to the right upper trapezius and tenderness in the right brachioradialis. Tried heat and movement.  Right arm hurts (3/10),  fingers/hand/arm occasionally get tingly specifically the thumb and index finger.. Currently, it feels like hand is asleep index finger and thumb forearm numbness comes and goes. Difficulty sleeping on the right side.    Had a virtual appointment then was told to be seen in urgent care, I was not able to review record during encounter. Was prescribed muscle relaxer and wrist splint for \" carpal tunnel\" although the symptoms are not in the median nerve distribution.  Ibuprofen 400 mg twice daily and flexeril without change of symptoms.  Denies weakness or loss of  strength. No previous trauma or overuse injuries.    Working from home on computer.  Social History     Socioeconomic History     Marital status:      Spouse name: Not on file     Number of children: Not on file     Years of education: Not on file     Highest education level: Not on file   Occupational History     Not on file   Tobacco Use     Smoking status: Never Smoker     Smokeless tobacco: Never Used   Vaping Use     Vaping Use: Never used   Substance and Sexual Activity     Alcohol use: Yes     Comment: beer occasionally     Drug use: Yes     Types: Marijuana     Sexual activity: Yes     Partners: Female     Birth control/protection: Condom     Comment: condom   Other Topics Concern     Not on file   Social History Narrative    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     Social Determinants of Health     Financial Resource Strain: Low Risk      Difficulty of Paying Living Expenses: Not hard at all   Food Insecurity: No Food Insecurity     Worried About Running Out of Food in the Last Year: Never true     Ran Out of Food in the " Last Year: Never true   Transportation Needs: No Transportation Needs     Lack of Transportation (Medical): No     Lack of Transportation (Non-Medical): No   Physical Activity: Sufficiently Active     Days of Exercise per Week: 7 days     Minutes of Exercise per Session: 30 min   Stress: No Stress Concern Present     Feeling of Stress : Only a little   Social Connections: Moderately Isolated     Frequency of Communication with Friends and Family: More than three times a week     Frequency of Social Gatherings with Friends and Family: More than three times a week     Attends Roman [employer] Services: Never     Active Member of Clubs or Organizations: No     Attends Club or Organization Meetings: Not on file     Marital Status:    Intimate Partner Violence: Not At Risk     Fear of Current or Ex-Partner: No     Emotionally Abused: No     Physically Abused: No     Sexually Abused: No   Housing Stability: Low Risk      Unable to Pay for Housing in the Last Year: No     Number of Places Lived in the Last Year: 1     Unstable Housing in the Last Year: No     Labs reviewed in EPIC  BP Readings from Last 3 Encounters:   01/12/22 105/70   12/14/21 115/65   12/06/21 101/66    Wt Readings from Last 3 Encounters:   12/14/21 68 kg (150 lb)   12/06/21 68 kg (150 lb)   05/14/19 62.6 kg (138 lb)                  Patient Active Problem List   Diagnosis     Bilateral varicoceles     Testicular hypofunction     Past Surgical History:   Procedure Laterality Date     PE TUBES       TONSILLECTOMY Bilateral 9/10/2014    Procedure: TONSILLECTOMY;  Surgeon: Heather Marie MD;  Location:  OR     VARICOCELECTOMY BILATERAL Bilateral 12/14/2021    Procedure: BILATERAL VARICOCELE REPAIR;  Surgeon: Bernabe Cowart MD;  Location: [facility]         Social History     Tobacco Use     Smoking status: Never Smoker     Smokeless tobacco: Never Used   Substance Use Topics     Alcohol use: Yes     Comment: beer  "occasionally     Family History   Problem Relation Age of Onset     Heart Disease Mother 45        stent in coronary artery     No Known Problems Father      No Known Problems Sister      LUNG DISEASE Maternal Grandmother      Heart Disease Maternal Grandfather 65        pacemaker     Coronary Artery Disease Maternal Uncle 57        age 55 stent in coronary artery         Current Outpatient Medications   Medication Sig Dispense Refill     cetirizine (ZYRTEC) 10 MG tablet Take 10 mg by mouth daily as needed for allergies       fluticasone (FLONASE) 50 MCG/ACT spray Spray 1 spray into both nostrils daily       IBUPROFEN PO Take 2 tablets by mouth as needed for moderate pain       Allergies   Allergen Reactions     Seasonal Allergies      Recent Labs   Lab Test 06/08/21  0956 05/14/19  0917 03/09/15  1718   ALT  --  16 18   CR 1.00 1.13 1.05   GFRESTIMATED >60 87 86   GFRESTBLACK >60 >90 >90  African American GFR Calc     POTASSIUM  --  4.4 4.0      Review of Systems   Problem list, PMH, Surgical HX, FH, SH, allergies, medications,immunizations reviewed and updated in Epic.  Reviewed surgical anesthesia record.  After the visit I reviewed urgent care visit.        Objective    /70 (BP Location: Right arm, Patient Position: Sitting, Cuff Size: Adult Regular)   Pulse 84   Temp 97.9  F (36.6  C) (Oral)   Resp 16   Ht 1.791 m (5' 10.5\")   SpO2 97%   BMI 21.22 kg/m    Body mass index is 21.22 kg/m .  Physical Exam   GENERAL: healthy, alert and no distress  EYES: Eyes grossly normal to inspection, PERRL and conjunctivae and sclerae normal  HEENT, mass removed briefly no oral lesions no erythema.  NECK: no adenopathy, no asymmetry, masses, or scars and thyroid normal to palpation  RESP: lungs clear to auscultation - no rales, rhonchi or wheezes  CV: regular rate and rhythm, normal S1 S2, no S3 or S4, no murmur, click or rub  MS: Neck full flexion full extension full rotation and lateral bending without discomfort, " full rom shoulders/arms/ wrists, no tenderness today in the trapezius left or right, notes aching in the right brachial radialis, normal  strength bilateral hands without any abnormalities of the thenar eminence no muscle wasting.  See neuro for strength.  No gross musculoskeletal defects noted, no edema  SKIN: no suspicious lesions or rashes  NEURO: Normal strength and tone, full strength upper extremities 5 out of 5 flexion extension, right biceps tendon reflex absent , right brachioradialis tendon reflex absent,  all other upper extremity reflexes are intact and 2+ normal neurological exam otherwise mentation intact and speech normal  PSYCH: mentation appears normal, affect normal/bright  LYMPH: no cervical or axillary adenopathy

## 2022-02-16 ENCOUNTER — OFFICE VISIT (OUTPATIENT)
Dept: NEUROLOGY | Facility: CLINIC | Age: 32
End: 2022-02-16
Attending: FAMILY MEDICINE
Payer: COMMERCIAL

## 2022-02-16 ENCOUNTER — ANCILLARY PROCEDURE (OUTPATIENT)
Dept: GENERAL RADIOLOGY | Facility: CLINIC | Age: 32
End: 2022-02-16
Attending: FAMILY MEDICINE
Payer: COMMERCIAL

## 2022-02-16 DIAGNOSIS — R20.0 NUMBNESS AND TINGLING IN RIGHT HAND: ICD-10-CM

## 2022-02-16 DIAGNOSIS — R20.2 NUMBNESS AND TINGLING IN RIGHT HAND: ICD-10-CM

## 2022-02-16 DIAGNOSIS — M79.18 BRACHIORADIALIS MUSCLE TENDERNESS: ICD-10-CM

## 2022-02-16 PROCEDURE — 95909 NRV CNDJ TST 5-6 STUDIES: CPT | Performed by: PSYCHIATRY & NEUROLOGY

## 2022-02-16 PROCEDURE — 95886 MUSC TEST DONE W/N TEST COMP: CPT | Performed by: PSYCHIATRY & NEUROLOGY

## 2022-02-16 PROCEDURE — 72040 X-RAY EXAM NECK SPINE 2-3 VW: CPT | Performed by: STUDENT IN AN ORGANIZED HEALTH CARE EDUCATION/TRAINING PROGRAM

## 2022-02-16 NOTE — LETTER
2022       RE: Flip Lopez  [address] [place] [address]     Dear Colleague,    Thank you for referring your patient, Flip Lopez, to the [facility] EM[facility] [place] at [facility]. Please see a copy of my visit note below.         [facility]  Electrodiagnostic Laboratory                 Department of Neurology  Test Date:  2022    Patient: Flip Lopez : 1990 Physician: Abdiel Crowe MD   Sex: Male AGE: 31 year Ref Phys:    ID#: [id number]   Technician: Kristy Behling     Clinical Information:  Flip Lopez is a 31 year old man with a sensory deficit in the dorsolateral aspect of the hand, thumb, and index finger. Symptoms have been present for several months. Initially the symptom was associated with discomfort that radiated proximally from the hand to the shoulder. That has resolved and numbness in the index finger has improved as well. Examination demonstrates normal tone and bulk and symmetric reflexes. He is referred for evaluation of suspected radial neuropathy or other potential etiologies of this symptom.    Techniques:  Motor and sensory conduction studies were done with surface recording electrodes. EMG was done with a concentric needle electrode.     Results:  A right radial sensory conduction study was normal. Right median and ulnar sensory and mixed nerve conduction studies were normal. A right radial motor conduction study demonstrated modest segmental amplitude reductions and relative conduction slowing across the spiral groove without definite absolute abnormalities or focal motor conduction block. Right median and ulnar motor conduction studies were normal. Electromyography of the right upper limb was normal.     Interpretation:  This study demonstrates no definite abnormalities. In particular, there is no definite electrodiagnostic evidence of right radial neuropathy and no  evidence of cervical radiculopathy, other mononeuropathy, or polyneuropathy. See comments.    Comment:  1. Resolving sensory conduction block proximal to the site of stimulation or radiculopathy largely sparing motor axons may not be identified in electrodiagnostic studies.  2. The radial conduction velocity in the arm was slower than in the forearm and there was a modest segmental reduction in compound muscle action potential area and amplitude between the forearm and arm. While focal demyelination in the radial nerve is therefore not excluded, these findings should be interpreted with caution because of technical variance inherent in radial motor conduction studies and the absence of corroborative evidence of a proximal radial neuropathy. Clinical and, if indicated, imaging correlation may be helpful.       ___________________________  Abdiel Crowe MD        Nerve Conduction Studies  Motor Sites      Latency Amplitude Neg. Amp Diff Segment Distance Velocity Neg. Dur Neg Area Diff Temperature Comment   Site (ms) Norm (mV) Norm %  cm m/s Norm ms %  C    Right Median (APB) Motor   Wrist 3.2  < 4.2 12.5  > 5.0  Wrist-APB 8   6.4  32.1    Elbow 7.2 - 12.3 - -1.60 Elbow-Wrist 24 60  > 48 6.5 -0.84 31.8    Right Median/Ulnar (Lumb-Dors Int II) Motor        Median (Lumb I)   Wrist 2.6 - 3.1 -      6.7  32.1         Ulnar (Dorsal Int (manus))   Wrist 2.3 - 4.8 -      5.2  32.3    Right Radial (EIP) Motor   Forearm 1.23  < 2.5 3.2  > 1.70      7.5  34.1    Up Arm 3.7 - 2.4 - -25.0 Up Arm-Forearm 17 68  > 48 8.5 -27.3 33.4    Axilla 5.4 - 2.1 - -12.5 Axilla-Up Arm 8.5 50 - 8.9 -7.3 33.1    Right Ulnar (ADM) Motor   Wrist 2.4  < 3.5 14.0  > 5.0  Wrist-ADM 8   5.5  32.3    Bel Elbow 5.9 - 13.4 - -4.3 Bel Elbow-Wrist 22 63  > 48 5.4 -3.6 32.3    Abv Elbow 7.5 - 13.2 - -1.49 Abv Elbow-Bel Elbow 9 56  > 48 5.6 -1.58 32.3      Sensory Sites      Onset Lat Peak Lat Amp (O-P) Amp (P-P) Segment Distance Velocity Temperature   Site ms  ms  V Norm  V  cm m/s Norm  C   Right Median Sensory   Wrist-Dig II 2.2 3.0 43  > 10 58 Wrist-Dig II 14 64  > 48 32.1   Right Median-Ulnar Palmar Sensory        Median   Palm-Wrist 1.48 2.0 80 - 105 Palm-Wrist 8 54 - 32        Ulnar   Palm-Wrist 1.10 1.70 35 - 47 Palm-Wrist 8 73 - 32   Right Radial Sensory   Forearm-Wrist 2.1 2.7 36  > 15 37 Forearm-Wrist 12.5 60 - 31.8   Right Ulnar Sensory   Wrist-Dig V 2.1 2.7 30  > 8 81 Wrist-Dig V 12.5 60  > 48 32.1     Inter-Nerve Comparisons     Nerve 1 Value 1 Nerve 2 Value 2 Parameter Result Normal   Sensory Sites   R Median Palm-Wrist 2.0 ms R Ulnar Palm-Wrist 1.7 ms Peak Lat Diff 0.30 ms <0.30       Electromyography     Side Muscle Ins Act Fibs/PSW Fasc HF Amp Dur Poly Recrt Int Pat   Right EIP Nml None Nml 0 Nml Nml 0 Nml Nml   Right Pronator Teres Nml None Nml 0 Nml Nml 0 Nml Nml   Right Biceps Nml None Nml 0 Nml Nml 0 Nml Nml   Right EDC Nml None Nml 0 Nml Nml 0 Nml Nml   Right Brachiorad Nml None Nml 0 Nml Nml 0 Nml Nml   Right Triceps Nml None Nml 0 Nml Nml 0 Nml Nml   Right Deltoid Nml None Nml 0 Nml Nml 0 Nml Nml   Right C6 Parasp Nml None Nml 0              NCS Waveforms:    Motor                Sensory                  Again, thank you for allowing me to participate in the care of your patient.      Sincerely,    Abdiel Crowe MD

## 2022-02-16 NOTE — PROGRESS NOTES
[facility]  Electrodiagnostic Laboratory                 Department of Neurology                                                                                                         Test Date:  2022    Patient: Flip Lopez : 1990 Physician: Abdiel Crowe MD   Sex: Male AGE: 31 year Ref Phys:    ID#: [id number]   Technician: Kristy Behling     Clinical Information:  Flip Lopez is a 31 year old man with a sensory deficit in the dorsolateral aspect of the hand, thumb, and index finger. Symptoms have been present for several months. Initially the symptom was associated with discomfort that radiated proximally from the hand to the shoulder. That has resolved and numbness in the index finger has improved as well. Examination demonstrates normal tone and bulk and symmetric reflexes. He is referred for evaluation of suspected radial neuropathy or other potential etiologies of this symptom.    Techniques:  Motor and sensory conduction studies were done with surface recording electrodes. EMG was done with a concentric needle electrode.     Results:  A right radial sensory conduction study was normal. Right median and ulnar sensory and mixed nerve conduction studies were normal. A right radial motor conduction study demonstrated modest segmental amplitude reductions and relative conduction slowing across the spiral groove without definite absolute abnormalities or focal motor conduction block. Right median and ulnar motor conduction studies were normal. Electromyography of the right upper limb was normal.     Interpretation:  This study demonstrates no definite abnormalities. In particular, there is no definite electrodiagnostic evidence of right radial neuropathy and no evidence of cervical radiculopathy, other mononeuropathy, or polyneuropathy. See comments.    Comment:  1. Resolving sensory conduction block proximal to the site of stimulation or radiculopathy  largely sparing motor axons may not be identified in electrodiagnostic studies.  2. The radial conduction velocity in the arm was slower than in the forearm and there was a modest segmental reduction in compound muscle action potential area and amplitude between the forearm and arm. While focal demyelination in the radial nerve is therefore not excluded, these findings should be interpreted with caution because of technical variance inherent in radial motor conduction studies and the absence of corroborative evidence of a proximal radial neuropathy. Clinical and, if indicated, imaging correlation may be helpful.       ___________________________  Abdiel Crowe MD        Nerve Conduction Studies  Motor Sites      Latency Amplitude Neg. Amp Diff Segment Distance Velocity Neg. Dur Neg Area Diff Temperature Comment   Site (ms) Norm (mV) Norm %  cm m/s Norm ms %  C    Right Median (APB) Motor   Wrist 3.2  < 4.2 12.5  > 5.0  Wrist-APB 8   6.4  32.1    Elbow 7.2 - 12.3 - -1.60 Elbow-Wrist 24 60  > 48 6.5 -0.84 31.8    Right Median/Ulnar (Lumb-Dors Int II) Motor        Median (Lumb I)   Wrist 2.6 - 3.1 -      6.7  32.1         Ulnar (Dorsal Int (manus))   Wrist 2.3 - 4.8 -      5.2  32.3    Right Radial (EIP) Motor   Forearm 1.23  < 2.5 3.2  > 1.70      7.5  34.1    Up Arm 3.7 - 2.4 - -25.0 Up Arm-Forearm 17 68  > 48 8.5 -27.3 33.4    Axilla 5.4 - 2.1 - -12.5 Axilla-Up Arm 8.5 50 - 8.9 -7.3 33.1    Right Ulnar (ADM) Motor   Wrist 2.4  < 3.5 14.0  > 5.0  Wrist-ADM 8   5.5  32.3    Bel Elbow 5.9 - 13.4 - -4.3 Bel Elbow-Wrist 22 63  > 48 5.4 -3.6 32.3    Abv Elbow 7.5 - 13.2 - -1.49 Abv Elbow-Bel Elbow 9 56  > 48 5.6 -1.58 32.3      Sensory Sites      Onset Lat Peak Lat Amp (O-P) Amp (P-P) Segment Distance Velocity Temperature   Site ms ms  V Norm  V  cm m/s Norm  C   Right Median Sensory   Wrist-Dig II 2.2 3.0 43  > 10 58 Wrist-Dig II 14 64  > 48 32.1   Right Median-Ulnar Palmar Sensory        Median   Palm-Wrist 1.48 2.0 80 -  105 Palm-Wrist 8 54 - 32        Ulnar   Palm-Wrist 1.10 1.70 35 - 47 Palm-Wrist 8 73 - 32   Right Radial Sensory   Forearm-Wrist 2.1 2.7 36  > 15 37 Forearm-Wrist 12.5 60 - 31.8   Right Ulnar Sensory   Wrist-Dig V 2.1 2.7 30  > 8 81 Wrist-Dig V 12.5 60  > 48 32.1     Inter-Nerve Comparisons     Nerve 1 Value 1 Nerve 2 Value 2 Parameter Result Normal   Sensory Sites   R Median Palm-Wrist 2.0 ms R Ulnar Palm-Wrist 1.7 ms Peak Lat Diff 0.30 ms <0.30       Electromyography     Side Muscle Ins Act Fibs/PSW Fasc HF Amp Dur Poly Recrt Int Pat   Right EIP Nml None Nml 0 Nml Nml 0 Nml Nml   Right Pronator Teres Nml None Nml 0 Nml Nml 0 Nml Nml   Right Biceps Nml None Nml 0 Nml Nml 0 Nml Nml   Right EDC Nml None Nml 0 Nml Nml 0 Nml Nml   Right Brachiorad Nml None Nml 0 Nml Nml 0 Nml Nml   Right Triceps Nml None Nml 0 Nml Nml 0 Nml Nml   Right Deltoid Nml None Nml 0 Nml Nml 0 Nml Nml   Right C6 Parasp Nml None Nml 0              NCS Waveforms:    Motor                Sensory

## 2022-03-02 ENCOUNTER — OFFICE VISIT (OUTPATIENT)
Dept: FAMILY MEDICINE | Facility: CLINIC | Age: 32
End: 2022-03-02
Payer: COMMERCIAL

## 2022-03-02 VITALS
DIASTOLIC BLOOD PRESSURE: 60 MMHG | BODY MASS INDEX: 20.98 KG/M2 | HEIGHT: 71 IN | WEIGHT: 149.9 LBS | RESPIRATION RATE: 12 BRPM | OXYGEN SATURATION: 95 % | TEMPERATURE: 97.8 F | HEART RATE: 75 BPM | SYSTOLIC BLOOD PRESSURE: 97 MMHG

## 2022-03-02 DIAGNOSIS — M50.30 DDD (DEGENERATIVE DISC DISEASE), CERVICAL: ICD-10-CM

## 2022-03-02 DIAGNOSIS — G56.31 RADIAL NEUROPATHY, RIGHT: Primary | ICD-10-CM

## 2022-03-02 PROCEDURE — 99214 OFFICE O/P EST MOD 30 MIN: CPT | Performed by: FAMILY MEDICINE

## 2022-03-02 ASSESSMENT — PAIN SCALES - GENERAL: PAINLEVEL: NO PAIN (0)

## 2022-03-02 NOTE — NURSING NOTE
Chief Complaint   Patient presents with     Recheck Medication     Follow up from recent imaging per patient       [place] EMT at 8:18 AM on 3/2/2022.

## 2022-03-02 NOTE — PATIENT INSTRUCTIONS
Patient Education     Know Your Neck: The Cervical Spine  By learning about the parts of the neck, you can better understand your neck problem. The bones of the neck are called cervical vertebrae, commonly identified as C1 through C7. Together, they form a bony column called the spine. Vertebrae also protect the spinal cord, a pathway for messages to reach the brain. Surrounding the spine are soft tissues such as muscles, tendons, and nerves.  Flexibility Is Key  For the neck to function normally, it has to be flexible enough to move without discomfort. A healthy neck can move easily in six different directions.     Flexion and extension move the head forward and backward.      Rotation turns the head from left to right, with the chin over the shoulders.      Lateral bending moves the head from side to side.   [employer] last reviewed this educational content on 5/1/2018 2000-2021 [employer]. All rights reserved. This information is not intended as a substitute for professional medical care. Always follow your healthcare professional's instructions.           Patient Education     Understanding Cervical Radiculopathy    Cervical radiculopathy is irritation or inflammation of a nerve root in the neck. It causes neck pain and other symptoms that may spread into the chest or down the arm. To understand this condition, it helps to understand the parts of the spine:    Vertebrae. These are bones that stack to form the spine. The cervical spine contains the 7 vertebrae in the neck.    Disks. These are soft pads of tissue between the vertebrae. They act as shock absorbers for the spine.    The spinal canal. This is a tunnel formed within the stacked vertebrae. The spinal cord runs through this canal.    Nerves. These branch off the spinal cord. As they leave the spinal canal, nerves pass through openings between the vertebrae. The nerve root is the part of the nerve that is closest to the spinal  cord.   With cervical radiculopathy, nerve roots in the neck become irritated. This leads to pain and symptoms that can travel to the nerves that go from the spinal cord down the arms and into the torso.  What causes cervical radiculopathy?  Aging, injury, poor posture, and other issues can lead to problems in the neck. These problems may then irritate nerve roots. These include:    Damage to a disk in the cervical spine. The damaged disk may then press on nearby nerve roots.    Degeneration from wear and tear, and aging. This can lead to narrowing (stenosis) of the openings between the vertebrae. The narrowed openings press on nerve roots as they leave the spinal canal.    An unstable spine. This is when a vertebra slips forward. It can then press on a nerve root.  There are other, less common causes of pressure on nerves in the neck. These include infection, cysts, and tumors.  Symptoms of cervical radiculopathy  These include:    Neck pain    Pain, numbness, tingling, or weakness that travels down the arm    Loss of neck movement    Muscle spasms  Treatment for cervical radiculopathy  In most cases, your healthcare provider will first try treatments that help relieve symptoms. These may include:    Prescription or over-the-counter pain medicines. These help relieve pain and swelling.    Cold packs. These help reduce pain.    Resting. This involves avoiding positions and activities that increase pain.    Neck brace (cervical collar). This can help relieve inflammation and pain.    Physical therapy, including exercises and stretches. This can help decrease pain and increase movement and function.    Shots of medicinesaround the nerve roots. This is done to help relieve symptoms for a time.  In some cases, your healthcare provider may advise surgery to fix the underlying problem. This depends on the cause, the symptoms, and how long the pain has lasted.  Possible complications  Over time, an irritated and inflamed  nerve may become damaged. This may lead to long-lasting (permanent) numbness or weakness. If symptoms change suddenly or get worse, be sure to let your healthcare provider know.     When to call your healthcare provider  Call your healthcare provider right away if you have any of these:    New pain or pain that gets worse    New or increasing weakness, numbness, or tingling in your arm or hand    Bowel or bladder changes   [employer] last reviewed this educational content on 3/10/2016    0251-7284 [employer]. All rights reserved. This information is not intended as a substitute for professional medical care. Always follow your healthcare professional's instructions.

## 2022-03-02 NOTE — PROGRESS NOTES
Assessment & Plan   Flip 31-year-old presents today for test results cervical XRAY and EMG in evaluation for onset of numbness and tingling in the right arm C5-6 distribution right thumb index finger after suspected positional transient radicular symptoms   Radial neuropathy, right  DDD (degenerative disc disease), cervical  We reviewed options of physical therapy however he would like to work on upper body strengthening and avoid abnormal positioning of his neck.  He notes his symptoms have almost resolved therefore we do not need to do an intervention at this time.  He will contact me if he would like to have physical therapy.  Today reviewed cervical spine disc disease importance of bone health obtaining calcium at 1000 mg best through nutritional form if not able to obtain through nutritional form as he does not drink milk could add an additional supplements such as Tums 500 mg daily as he does consume almond milk and orange juice with calcium.  He will contact me if symptoms are worsening.  36 minutes spent on the date of the encounter doing chart review, history, exam, diagnostics review, documentation, counseling and coordination of cares as noted.               Abisai Arrington MD  [facility]   Flip is a 31 year old who presents for the following health issues     HPI   Flip 31-year-old presents today for test results cervical XRAY and EMG in evaluation for onset of numbness and tingling in the right arm C5-6 distribution right thumb index finger after suspected positional transient radicular symptoms after surgery 12/14/2021 for varicocele. We suspected his symptoms would improve with time. He has noted almost complete resolution of numbness and has not had any weakness.  Today we are reviewing his EMG as noted below and cervical spine films.           Labs reviewed in EPIC  BP Readings from Last 3 Encounters:   03/02/22 97/60   01/12/22 105/70    12/14/21 115/65    Wt Readings from Last 3 Encounters:   03/02/22 68 kg (149 lb 14.4 oz)   12/14/21 68 kg (150 lb)   12/06/21 68 kg (150 lb)                  Patient Active Problem List   Diagnosis     Bilateral varicoceles     Testicular hypofunction     Brachioradialis muscle tenderness     Numbness and tingling in right hand     Past Surgical History:   Procedure Laterality Date     PE TUBES       TONSILLECTOMY Bilateral 9/10/2014    Procedure: TONSILLECTOMY;  Surgeon: Heather Marie MD;  Location: [facility] OR     VARICOCELECTOMY BILATERAL Bilateral 12/14/2021    Procedure: BILATERAL VARICOCELE REPAIR;  Surgeon: Bernabe Cowart MD;  Location: [facility] OR     [facility]         Social History     Tobacco Use     Smoking status: Never Smoker     Smokeless tobacco: Never Used   Substance Use Topics     Alcohol use: Yes     Comment: beer occasionally     Family History   Problem Relation Age of Onset     Heart Disease Mother 45        stent in coronary artery     No Known Problems Father      No Known Problems Sister      LUNG DISEASE Maternal Grandmother      Heart Disease Maternal Grandfather 65        pacemaker     Coronary Artery Disease Maternal Uncle 57        age 55 stent in coronary artery         Current Outpatient Medications   Medication Sig Dispense Refill     cetirizine (ZYRTEC) 10 MG tablet Take 10 mg by mouth daily as needed for allergies       fluticasone (FLONASE) 50 MCG/ACT spray Spray 1 spray into both nostrils daily       IBUPROFEN PO Take 2 tablets by mouth as needed for moderate pain       Allergies   Allergen Reactions     Seasonal Allergies      Recent Labs   Lab Test 06/08/21  0956 05/14/19  0917 03/09/15  1718   ALT  --  16 18   CR 1.00 1.13 1.05   GFRESTIMATED >60 87 86   GFRESTBLACK >60 >90 >90  African American GFR Calc     POTASSIUM  --  4.4 4.0      Review of Systems   Problem list, PMH, Surgical HX,  SH, allergies, medications,immunizations reviewed and updated in Epic.  "        Objective    BP 97/60 (BP Location: Right arm, Patient Position: Sitting, Cuff Size: Adult Large)   Pulse 75   Temp 97.8  F (36.6  C) (Oral)   Resp 12   Ht 1.791 m (5' 10.5\")   Wt 68 kg (149 lb 14.4 oz)   SpO2 95%   BMI 21.20 kg/m    Body mass index is 21.2 kg/m .  Physical Exam   MS: Neck full flexion full extension full rotation and lateral bending without discomfort, full rom shoulders/arms/ wrists, no tenderness  Normal muscle mass right brachial radialis, normal  strength bilateral hands without any abnormalities of the thenar eminence no muscle wasting.  See neuro for strength.  No gross musculoskeletal defects noted, no edema  SKIN: no suspicious lesions or rashes  NEURO: Normal strength and tone, full strength upper extremities 5 out of 5 flexion extension, right biceps tendon reflex 1+ , right brachioradialis tendon reflex 1+,  all other upper extremity reflexes are intact and 2+ normal neurological exam otherwise mentation intact and speech normal  PSYCH: mentation appears normal, affect normal/bright    Exam: XR CERVICAL SPINE 2/3 VWS, 2/16/2022 9:21 AM     Indication: Numbness and tingling in right hand; Numbness and tingling  in right hand; Brachioradialis muscle tenderness     Comparison: Neck CT from 3/19/2015     Findings: AP lateral and odontoid views of the cervical spine.     Minimal disc height loss at C6-7 with minimal accompanying  retrolisthesis. Early forming osteophytes in the anterior and  posterior aspect of the opposing endplates. These findings appear to  be slightly progressed compared with CT from 2015. No acute fracture.  No lytic bone lesion. No prevertebral soft tissue swelling. Normal air  tract throughout the pharynx, larynx and upper trachea. Visualized  lung structures are unremarkable. Normal mandible and  Normal alignment of the C1 and C2 vertebrae.                                                                      Impression: Degenerative trace " retrolisthesis, disc height loss and  early forming osteophytes at C6-7 disc space. Findings appear to be  progressed since CT from 2015 despite differences in technique .     DAVID AGUIRRE MD      2/16/22 EMG Results:  A right radial sensory conduction study was normal. Right median and ulnar sensory and mixed nerve conduction studies were normal. A right radial motor conduction study demonstrated modest segmental amplitude reductions and relative conduction slowing across the spiral groove without definite absolute abnormalities or focal motor conduction block. Right median and ulnar motor conduction studies were normal. Electromyography of the right upper limb was normal.      Interpretation:  This study demonstrates no definite abnormalities. In particular, there is no definite electrodiagnostic evidence of right radial neuropathy and no evidence of cervical radiculopathy, other mononeuropathy, or polyneuropathy. See comments.     Comment:  1. Resolving sensory conduction block proximal to the site of stimulation or radiculopathy largely sparing motor axons may not be identified in electrodiagnostic studies.  2. The radial conduction velocity in the arm was slower than in the forearm and there was a modest segmental reduction in compound muscle action potential area and amplitude between the forearm and arm. While focal demyelination in the radial nerve is therefore not excluded, these findings should be interpreted with caution because of technical variance inherent in radial motor conduction studies and the absence of corroborative evidence of a proximal radial neuropathy. Clinical and, if indicated, imaging correlation may be helpful.   Reviewed above  Abisai Arrington MD

## 2022-04-01 ENCOUNTER — PRE VISIT (OUTPATIENT)
Dept: UROLOGY | Facility: CLINIC | Age: 32
End: 2022-04-01
Payer: COMMERCIAL

## 2022-04-01 DIAGNOSIS — R86.8 TERATOSPERMIA: Primary | ICD-10-CM

## 2022-04-01 NOTE — TELEPHONE ENCOUNTER
Reason for visit: Post op follow up     Relevant information: s/p bilateral varicocele repair    Records/imaging/labs/orders: in Epic; needs SA    Pt called: no    At Rooming: normal

## 2022-04-20 ENCOUNTER — TELEPHONE (OUTPATIENT)
Dept: UROLOGY | Facility: CLINIC | Age: 32
End: 2022-04-20
Payer: COMMERCIAL

## 2022-04-20 NOTE — TELEPHONE ENCOUNTER
Writer called and left generalized VM to pt in regards to getting labs done before Dr Cowart's appt.    Writer unable to find another location within [facility] that performs the SA's.

## 2022-05-15 ENCOUNTER — HEALTH MAINTENANCE LETTER (OUTPATIENT)
Age: 32
End: 2022-05-15

## 2022-05-17 ENCOUNTER — PRE VISIT (OUTPATIENT)
Dept: UROLOGY | Facility: CLINIC | Age: 32
End: 2022-05-17
Payer: COMMERCIAL

## 2022-05-17 NOTE — TELEPHONE ENCOUNTER
Reason for visit: Post op follow up     Relevant information: s/p bilateral varicocele repair    Records/imaging/labs/orders: in EPIC    Pt called: n    At Rooming: onormal

## 2022-06-06 ENCOUNTER — LAB (OUTPATIENT)
Dept: LAB | Facility: CLINIC | Age: 32
End: 2022-06-06
Payer: COMMERCIAL

## 2022-06-06 DIAGNOSIS — R86.8 TERATOSPERMIA: ICD-10-CM

## 2022-06-06 LAB
ABNORMAL SPERM MORPHOLOGY: 98
ABSTINENCE DAYS: 7 DAYS (ref 2–7)
AGGLUTINATION: NO
ANALYSIS TEMP - CENTIGRADE: 23 CENTIGRADE
COLLECTION METHOD: ABNORMAL
COLLECTION SITE: ABNORMAL
CONSENT TO RELEASE TO PARTNER: NO
DAL- RECEIVED TIME: ABNORMAL
HEAD DEFECT: 98 %
IMMOTILE: 28 %
LIQUEFIED: YES
MIDPIECE DEFECT: 46 %
NON-PROGRESSIVE MOTILITY: 11 %
NORMAL SPERM MORPHOLOGY: 2 % NORMAL FORMS
PROGRESSIVE MOTILITY: 61 %
ROUND CELLS: 0.3 MILLION/ML
SPECIMEN PH: 7.6 PH
SPECIMEN VOLUME: 2.6 ML
SPERM CONCENTRATION: 39 MILLION/ML
TAIL DEFECT: 10 %
TIME OF ANALYSIS: ABNORMAL
TOTAL PROGRESSIVE MOTILE NUMBER: 62 MILLION
TOTAL SPERM NUMBER: 101 MILLION
VISCOUS: NO
VITALITY: ABNORMAL

## 2022-06-06 PROCEDURE — 89322 SEMEN ANAL STRICT CRITERIA: CPT

## 2022-06-08 NOTE — RESULT ENCOUNTER NOTE
Dear Flip,     Here are your recent results.     Semen analysis results are significantly improved after your varicocele repair 6 months ago.  Very excellent improvements.  We can discuss further at your appointment this week.    Please let us know if you have any immediate questions.  Thank You      Darline COLE

## 2022-06-09 ENCOUNTER — OFFICE VISIT (OUTPATIENT)
Dept: UROLOGY | Facility: CLINIC | Age: 32
End: 2022-06-09
Payer: COMMERCIAL

## 2022-06-09 VITALS
DIASTOLIC BLOOD PRESSURE: 73 MMHG | WEIGHT: 145 LBS | HEIGHT: 70 IN | HEART RATE: 71 BPM | SYSTOLIC BLOOD PRESSURE: 109 MMHG | BODY MASS INDEX: 20.76 KG/M2

## 2022-06-09 DIAGNOSIS — Z86.79 S/P EXCISION OF VARICOCELE: Primary | ICD-10-CM

## 2022-06-09 DIAGNOSIS — Z98.890 S/P EXCISION OF VARICOCELE: Primary | ICD-10-CM

## 2022-06-09 PROCEDURE — 99213 OFFICE O/P EST LOW 20 MIN: CPT | Performed by: UROLOGY

## 2022-06-09 ASSESSMENT — PAIN SCALES - GENERAL: PAINLEVEL: NO PAIN (0)

## 2022-06-09 NOTE — PROGRESS NOTES
"HPI:  Flip Lopez is a 32 year old male being seen for follow-up bilateral varicocele repair 12/14/21      Exam:  Physical Exam  /73   Pulse 71   Ht 1.778 m (5' 10\")   Wt 65.8 kg (145 lb)   BMI 20.81 kg/m      General: Alert, oriented, nad.  Pleasant and conversant.  Eyes: anicteric, EOMI.  Pulse: regular  Resps: normal, non-labored.  Abdomen:  Nondistended.  Neurological - no tremors  Skin - no discoloration/ lesions noted   exam   Phallus normal   Testes ++, anodular, nontender.  Vas and epididymis present and normal bilaterally  No varicocele noted.  COLLEEN not indicated      Review of Imaging:  The following imaging exams were independently viewed and interpreted by me and discussed with patient:  N/A      Review of Labs:  The following labs were reviewed by me and discussed with the patient:  Component      Latest Ref Rng & Units 7/6/2021 6/6/2022   Collection Method       Masturbation Masturbation   Collection Site       [facility]   Specimen Type       Semen    Lab Receipt Time       11:50 AM    Time of Analysis       12:10 PM 10:30 AM   Analysis Temp - Centigrade      centigrade 23.5 23.0   Abstinence days      2 - 7 days 2 7   Liquefied      Yes Yes Yes   Viscous      No No No   Agglutination      No No No   pH      >=7.2 pH 7.6 7.6   Volume      >=1.4 mL 1.3 (A) 2.6   Concentration      >=16.0 million/ml 10 (A) 39.0   Total Number      >=39.0 million 13 (A) 101.0   Progressive motility      >=30 % 79 61   Non-progressive motility      % 4 11   Immotile      % 17 28   Total Progressive Motile      million 10 (A) 62.00   Vitality       ND    Normal Sperm      >=4 % Normal forms 1 (A) 2 (L)   Abnormal Sperm       99 98   Head Defect      % 99 98   Midpiece Defect      % 29 46   Tail Defect      % 16 10   Round Cells      <=2.0 million/ml 0.6 0.3   WBC      % .    Immature Sperm      % .    Cell Fragments      % .    Consent to Release to Partner       Yes No   Time of Receipt        10:14 AM "   Semen Analysis 2/15/21:  3.2ml,  pH 7.6, 29.9M/cc, 50% progressive, 0.5% morphology (>4%), Total Motile Count: 52.6 Million.         Assessment & Plan   1. Status-post bilateral varicocele repair.  Total progressive motile count improved to 62 million, his best semen analysis result yet.  Semen analysis results are better in most every measure.  2. Recommended keep trying with timed intercourse.  Discussed option of IUI (intrauterine inseminations) or IVF with a female fertility specialist.  3. Recommended repeat semen analysis in 3-4 months.  Lab ordered.  Will send results on Novogy.    Bernabe Cowart MD  [facility] UROLOGY CLINIC [place]      ==========================      Additional Coding Information:    Problems:  3 -- one stable chronic illness    Data Reviewed  3 or more studies reviewed, as listed above     Tests ordered: semen analysis     Level of risk:  3 -- low risk (e.g., OTC medication or observation, minor surgery without risks)    Time spent:  15 minutes spent on the date of the encounter doing chart review, history and exam, documentation and further activities per the note.

## 2022-06-09 NOTE — NURSING NOTE
"Chief Complaint   Patient presents with     Surgical Followup     s/p bilateral varicocele repair       Blood pressure 109/73, pulse 71, height 1.778 m (5' 10\"), weight 65.8 kg (145 lb). Body mass index is 20.81 kg/m .    Patient Active Problem List   Diagnosis     Bilateral varicoceles     Testicular hypofunction     Brachioradialis muscle tenderness     Numbness and tingling in right hand     Radial neuropathy, right     DDD (degenerative disc disease), cervical       Allergies   Allergen Reactions     Seasonal Allergies        Current Outpatient Medications   Medication Sig Dispense Refill     cetirizine (ZYRTEC) 10 MG tablet Take 10 mg by mouth daily as needed for allergies       IBUPROFEN PO Take 2 tablets by mouth as needed for moderate pain       fluticasone (FLONASE) 50 MCG/ACT spray Spray 1 spray into both nostrils daily (Patient not taking: Reported on 6/9/2022)         Social History     Tobacco Use     Smoking status: Never Smoker     Smokeless tobacco: Never Used   Vaping Use     Vaping Use: Never used   Substance Use Topics     Alcohol use: Yes     Comment: beer occasionally     Drug use: Yes     Types: Marijuana       FISH Godoy  6/9/2022  3:33 PM  "

## 2022-07-18 ENCOUNTER — OFFICE VISIT (OUTPATIENT)
Dept: FAMILY MEDICINE | Facility: CLINIC | Age: 32
End: 2022-07-18
Payer: COMMERCIAL

## 2022-07-18 ENCOUNTER — LAB (OUTPATIENT)
Dept: LAB | Facility: CLINIC | Age: 32
End: 2022-07-18
Payer: COMMERCIAL

## 2022-07-18 VITALS
OXYGEN SATURATION: 96 % | HEART RATE: 64 BPM | DIASTOLIC BLOOD PRESSURE: 65 MMHG | WEIGHT: 145 LBS | BODY MASS INDEX: 20.76 KG/M2 | SYSTOLIC BLOOD PRESSURE: 106 MMHG | HEIGHT: 70 IN

## 2022-07-18 DIAGNOSIS — D22.9 ATYPICAL NEVI: ICD-10-CM

## 2022-07-18 DIAGNOSIS — Z82.49 FAMILY HISTORY OF CARDIAC DISORDER: ICD-10-CM

## 2022-07-18 DIAGNOSIS — Z00.00 ENCOUNTER FOR SCREENING AND PREVENTATIVE CARE: Primary | ICD-10-CM

## 2022-07-18 LAB
ALBUMIN SERPL-MCNC: 4 G/DL (ref 3.4–5)
ALP SERPL-CCNC: 56 U/L (ref 40–150)
ALT SERPL W P-5'-P-CCNC: 13 U/L (ref 0–70)
ANION GAP SERPL CALCULATED.3IONS-SCNC: 6 MMOL/L (ref 3–14)
AST SERPL W P-5'-P-CCNC: 16 U/L (ref 0–45)
BILIRUB SERPL-MCNC: 0.6 MG/DL (ref 0.2–1.3)
BUN SERPL-MCNC: 8 MG/DL (ref 7–30)
CALCIUM SERPL-MCNC: 9.2 MG/DL (ref 8.5–10.1)
CHLORIDE BLD-SCNC: 103 MMOL/L (ref 94–109)
CHOLEST SERPL-MCNC: 149 MG/DL
CO2 SERPL-SCNC: 30 MMOL/L (ref 20–32)
CREAT SERPL-MCNC: 1.11 MG/DL (ref 0.66–1.25)
FASTING STATUS PATIENT QL REPORTED: YES
GFR SERPL CREATININE-BSD FRML MDRD: 90 ML/MIN/1.73M2
GLUCOSE BLD-MCNC: 88 MG/DL (ref 70–99)
HDLC SERPL-MCNC: 56 MG/DL
LDLC SERPL CALC-MCNC: 83 MG/DL
NONHDLC SERPL-MCNC: 93 MG/DL
POTASSIUM BLD-SCNC: 4 MMOL/L (ref 3.4–5.3)
PROT SERPL-MCNC: 7.3 G/DL (ref 6.8–8.8)
SODIUM SERPL-SCNC: 139 MMOL/L (ref 133–144)
TRIGL SERPL-MCNC: 50 MG/DL

## 2022-07-18 PROCEDURE — 80061 LIPID PANEL: CPT | Performed by: PATHOLOGY

## 2022-07-18 PROCEDURE — 99395 PREV VISIT EST AGE 18-39: CPT | Performed by: FAMILY MEDICINE

## 2022-07-18 PROCEDURE — 80053 COMPREHEN METABOLIC PANEL: CPT | Performed by: PATHOLOGY

## 2022-07-18 PROCEDURE — 36415 COLL VENOUS BLD VENIPUNCTURE: CPT | Performed by: PATHOLOGY

## 2022-07-18 SDOH — ECONOMIC STABILITY: FOOD INSECURITY: WITHIN THE PAST 12 MONTHS, THE FOOD YOU BOUGHT JUST DIDN'T LAST AND YOU DIDN'T HAVE MONEY TO GET MORE.: NEVER TRUE

## 2022-07-18 SDOH — ECONOMIC STABILITY: FOOD INSECURITY: HOW HARD IS IT FOR YOU TO PAY FOR THE VERY BASICS LIKE FOOD, HOUSING, MEDICAL CARE, AND HEATING?: NOT VERY HARD

## 2022-07-18 SDOH — SOCIAL STABILITY: SOCIAL NETWORK: HOW OFTEN DO YOU GET TOGETHER WITH FRIENDS OR RELATIVES?: MORE THAN THREE TIMES A WEEK

## 2022-07-18 SDOH — ECONOMIC STABILITY: TRANSPORTATION INSECURITY: IN THE PAST 12 MONTHS, HAS LACK OF TRANSPORTATION KEPT YOU FROM MEDICAL APPOINTMENTS OR FROM GETTING MEDICATIONS?: NO

## 2022-07-18 SDOH — HEALTH STABILITY: MENTAL HEALTH: HOW OFTEN DO YOU HAVE SIX OR MORE DRINKS ON ONE OCCASION?: NEVER

## 2022-07-18 SDOH — ECONOMIC STABILITY: FOOD INSECURITY: WITHIN THE PAST 12 MONTHS, YOU WORRIED THAT YOUR FOOD WOULD RUN OUT BEFORE YOU GOT THE MONEY TO BUY MORE.: NEVER TRUE

## 2022-07-18 SDOH — SOCIAL STABILITY: SOCIAL NETWORK
DO YOU BELONG TO ANY CLUBS OR ORGANIZATIONS SUCH AS CHURCH GROUPS, UNIONS, FRATERNAL OR ATHLETIC GROUPS, OR SCHOOL GROUPS?: NO

## 2022-07-18 SDOH — HEALTH STABILITY: MENTAL HEALTH
DO YOU FEEL STRESS - TENSE, RESTLESS, NERVOUS, OR ANXIOUS, OR UNABLE TO SLEEP AT NIGHT BECAUSE YOUR MIND IS TROUBLED ALL THE TIME - THESE DAYS?: TO SOME EXTENT

## 2022-07-18 SDOH — HEALTH STABILITY: PHYSICAL HEALTH: ON AVERAGE, HOW MANY DAYS PER WEEK DO YOU ENGAGE IN MODERATE TO STRENUOUS EXERCISE (LIKE A BRISK WALK)?: 7 DAYS

## 2022-07-18 SDOH — HEALTH STABILITY: MENTAL HEALTH: HOW MANY DRINKS CONTAINING ALCOHOL DO YOU HAVE ON A TYPICAL DAY WHEN YOU ARE DRINKING?: 1 OR 2

## 2022-07-18 SDOH — SOCIAL STABILITY: SOCIAL INSECURITY: WITHIN THE LAST YEAR, HAVE YOU BEEN AFRAID OF YOUR PARTNER OR EX-PARTNER?: NO

## 2022-07-18 SDOH — HEALTH STABILITY: PHYSICAL HEALTH: ON AVERAGE, HOW MANY MINUTES DO YOU ENGAGE IN EXERCISE AT THIS LEVEL?: 10 MIN

## 2022-07-18 SDOH — ECONOMIC STABILITY: HOUSING INSECURITY: IN THE LAST 12 MONTHS, WAS THERE A TIME WHEN YOU WERE NOT ABLE TO PAY THE MORTGAGE OR RENT ON TIME?: NO

## 2022-07-18 SDOH — ECONOMIC STABILITY: HOUSING INSECURITY: IN THE LAST 12 MONTHS, HOW MANY PLACES HAVE YOU LIVED?: 1

## 2022-07-18 SDOH — SOCIAL STABILITY: SOCIAL INSECURITY: WITHIN THE LAST YEAR, HAVE YOU BEEN HUMILIATED OR EMOTIONALLY ABUSED IN OTHER WAYS BY YOUR PARTNER OR EX-PARTNER?: NO

## 2022-07-18 SDOH — SOCIAL STABILITY: SOCIAL INSECURITY: ARE YOU MARRIED, WIDOWED, DIVORCED, SEPARATED, NEVER MARRIED, OR LIVING WITH A PARTNER?: MARRIED

## 2022-07-18 SDOH — HEALTH STABILITY: MENTAL HEALTH: HOW OFTEN DO YOU HAVE A DRINK CONTAINING ALCOHOL?: MONTHLY OR LESS

## 2022-07-18 SDOH — SOCIAL STABILITY: SOCIAL NETWORK: HOW OFTEN DO YOU ATTEND CHURCH OR RELIGIOUS SERVICES?: NEVER

## 2022-07-18 SDOH — SOCIAL STABILITY: SOCIAL INSECURITY
WITHIN THE LAST YEAR, HAVE YOU BEEN RAPED OR FORCED TO HAVE ANY KIND OF SEXUAL ACTIVITY BY YOUR PARTNER OR EX-PARTNER?: NO

## 2022-07-18 SDOH — SOCIAL STABILITY: SOCIAL NETWORK: HOW OFTEN DO YOU ATTEND MEETINGS OF THE CLUBS OR ORGANIZATIONS YOU BELONG TO?: NEVER

## 2022-07-18 ASSESSMENT — ANXIETY QUESTIONNAIRES
7. FEELING AFRAID AS IF SOMETHING AWFUL MIGHT HAPPEN: SEVERAL DAYS
GAD7 TOTAL SCORE: 3
GAD7 TOTAL SCORE: 3
2. NOT BEING ABLE TO STOP OR CONTROL WORRYING: NOT AT ALL
5. BEING SO RESTLESS THAT IT IS HARD TO SIT STILL: NOT AT ALL
1. FEELING NERVOUS, ANXIOUS, OR ON EDGE: NOT AT ALL
6. BECOMING EASILY ANNOYED OR IRRITABLE: SEVERAL DAYS
IF YOU CHECKED OFF ANY PROBLEMS ON THIS QUESTIONNAIRE, HOW DIFFICULT HAVE THESE PROBLEMS MADE IT FOR YOU TO DO YOUR WORK, TAKE CARE OF THINGS AT HOME, OR GET ALONG WITH OTHER PEOPLE: NOT DIFFICULT AT ALL
3. WORRYING TOO MUCH ABOUT DIFFERENT THINGS: SEVERAL DAYS

## 2022-07-18 ASSESSMENT — PATIENT HEALTH QUESTIONNAIRE - PHQ9
5. POOR APPETITE OR OVEREATING: NOT AT ALL
SUM OF ALL RESPONSES TO PHQ QUESTIONS 1-9: 6

## 2022-07-18 ASSESSMENT — LIFESTYLE VARIABLES
AUDIT-C TOTAL SCORE: 1
SKIP TO QUESTIONS 9-10: 1

## 2022-07-18 ASSESSMENT — ACTIVITIES OF DAILY LIVING (ADL): LACK_OF_TRANSPORTATION: NO

## 2022-07-18 ASSESSMENT — PAIN SCALES - GENERAL: PAINLEVEL: NO PAIN (0)

## 2022-07-18 NOTE — NURSING NOTE
Chief Complaint   Patient presents with     Physical     Pt here for physical       Russell Lopes CMA, EMT at 1:12 PM on 7/18/2022.

## 2022-07-18 NOTE — PROGRESS NOTES
3  SUBJECTIVE:   CC: Flip Lopez is an 32 year old male who presents for preventive health visit.   Flip Lopez and his wife have been trying to conceive for  several  years, noted varicoceles, decreased testicular funciton and noted low grade pain in the groin, bilateral varicoceles. He had a surgical varicocele  procedure 12/14/2021 reviewed anesthesia fentanyl propofol recovered well from the procedure. He noted back pain while recovery from surgery as he was lying in bed for a few days.  He noted slight pain with deep breath which resolved after he increased his activity adn noted tingling in the right arm and pit of the right axilla. He denied trauma and is uncertain of the position he was in during the surgery.  He noted tightness to the right upper trapezius and tenderness in the right brachioradialis. He had PT and feels fully recovered  He started a new job working with [employer] requiring him to go in to work.  He and his wife only have 1 car but or working out the details.  He is a little nervous about starting the new job  HCM  Hep c screening.  Low risk  Health care habits he uses CBD Gummies and vaping advised against    Patient has been advised of split billing requirements and indicates understanding: Yes  Healthy Habits:    Do you get at least three servings of calcium containing foods daily (dairy, green leafy vegetables, etc.)? yes    Amount of exercise or daily activities, outside of work: 1 day/week 10 minutes    Problems taking medications regularly No    Medication side effects: No    Have you had an eye exam in the past two years? yes    Do you see a dentist twice per year? yes    Do you have sleep apnea, excessive snoring or daytime drowsiness?no          Today's PHQ-2 Score:   PHQ-2 ( 1999 Pfizer) 7/18/2022 7/18/2022   Q1: Little interest or pleasure in doing things 1 0   Q2: Feeling down, depressed or hopeless 0 0   PHQ-2 Score 1 0   PHQ-2 Total Score (12-17 Years)-  Positive if 3 or more points; Administer PHQ-A if positive - -       Social History     Socioeconomic History     Marital status:      Spouse name: Not on file     Number of children: Not on file     Years of education: Not on file     Highest education level: Not on file   Occupational History     Not on file   Tobacco Use     Smoking status: Never Smoker     Smokeless tobacco: Never Used   Vaping Use     Vaping Use: Some days     Substances: THC     Devices: Pre-filled or refillable cartridge, [place] since 2012   Substance and Sexual Activity     Alcohol use: Not Currently     Comment: beer occasionally     Drug use: Yes     Types: Marijuana     Comment: edibles vaporizer     Sexual activity: Yes     Partners: Female     Birth control/protection: None     Comment: condom   Other Topics Concern     Not on file   Social History Narrative    Communication  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     Roxanne 2019    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      Social Determinants of Health     Financial Resource Strain: Low Risk      Difficulty of Paying Living Expenses: Not very hard   Food Insecurity: No Food Insecurity     Worried About Running Out of Food in the Last Year: Never true     Ran Out of Food in the Last Year: Never true   Transportation Needs: No Transportation Needs     Lack of Transportation (Medical): No     Lack of Transportation (Non-Medical): No   Physical Activity: Insufficiently Active     Days of Exercise per Week: 7 days     Minutes of Exercise per Session: 10 min   Stress: Stress Concern Present     Feeling of Stress : To some extent   Social Connections: Moderately Isolated     Frequency of Communication with Friends and Family: More than three times a week     Frequency of Social Gatherings with Friends and Family: More than three times a week     Attends Mandaeism Services: Never     Active Member of Clubs or Organizations: No     Attends Club or Organization Meetings: Never      Marital Status:    Intimate Partner Violence: Not At Risk     Fear of Current or Ex-Partner: No     Emotionally Abused: No     Physically Abused: No     Sexually Abused: No   Housing Stability: Low Risk      Unable to Pay for Housing in the Last Year: No     Number of Places Lived in the Last Year: 1     Unstable Housing in the Last Year: No                           Last PSA: No results found for: PSA    Reviewed orders with patient. Reviewed health maintenance and updated orders accordingly - Yes  Labs reviewed in EPIC  BP Readings from Last 3 Encounters:   07/18/22 106/65   06/09/22 109/73   03/02/22 97/60    Wt Readings from Last 3 Encounters:   07/18/22 65.8 kg (145 lb)   06/09/22 65.8 kg (145 lb)   03/02/22 68 kg (149 lb 14.4 oz)            Immunization History   Administered Date(s) Administered     COVID-19,PF,Pfizer (12+ Yrs) 04/01/2021, 04/01/2021, 04/29/2021, 04/29/2021, 11/18/2021     DTaP, Unspecified 07/11/2018     Influenza Vaccine IM > 6 months Valent IIV4 (Alfuria,Fluzone) 09/13/2021     TDAP Vaccine (Boostrix) 07/11/2018           Patient Active Problem List   Diagnosis     Bilateral varicoceles     Testicular hypofunction     Brachioradialis muscle tenderness     Numbness and tingling in right hand     Radial neuropathy, right     DDD (degenerative disc disease), cervical     Past Surgical History:   Procedure Laterality Date     PE TUBES       TONSILLECTOMY Bilateral 9/10/2014    Procedure: TONSILLECTOMY;  Surgeon: Heather Marie MD;  Location:  OR     VARICOCELECTOMY BILATERAL Bilateral 12/14/2021    Procedure: BILATERAL VARICOCELE REPAIR;  Surgeon: Bernabe Cowart MD;  Location: [facility] OR     [facility]         Social History     Tobacco Use     Smoking status: Never Smoker     Smokeless tobacco: Never Used   Substance Use Topics     Alcohol use: Not Currently     Comment: beer occasionally     Family History   Problem Relation Age of Onset     Heart Disease  "Mother 45        stent in coronary artery     Cerebrovascular Disease Mother 52     Obesity Father      No Known Problems Sister      LUNG DISEASE Maternal Grandmother      Heart Disease Maternal Grandfather 65        pacemaker     Diabetes Paternal Grandmother      Coronary Artery Disease Maternal Uncle 57        age 55 stent in coronary artery     Seizure Disorder Maternal Uncle          Current Outpatient Medications   Medication Sig Dispense Refill     cetirizine (ZYRTEC) 10 MG tablet Take 10 mg by mouth daily as needed for allergies       Allergies   Allergen Reactions     Seasonal Allergies      Recent Labs   Lab Test 07/18/22  1415 06/08/21  0956 05/14/19  0917 03/09/15  1718   LDL 83  --   --   --    HDL 56  --   --   --    TRIG 50  --   --   --    ALT 13  --  16 18   CR 1.11 1.00 1.13 1.05   GFRESTIMATED 90 >60 87 86   GFRESTBLACK  --  >60 >90 >90  African American GFR Calc     POTASSIUM 4.0  --  4.4 4.0           ROS:  Problem list, PMH, Surgical HX, FH, SH, allergies, medications,immunizations reviewed and updated in Epic. 10 point ROS negative other than noted in HPI and ROS.  Family history is positive for cardiovascular disease.      OBJECTIVE:   /65 (BP Location: Right arm, Patient Position: Sitting, Cuff Size: Adult Regular)   Pulse 64   Ht 1.778 m (5' 10\")   Wt 65.8 kg (145 lb)   SpO2 96%   BMI 20.81 kg/m    EXAM:  GENERAL APPEARANCE: healthy, alert and no distress     EYES: EOMI,  PERRL     HENT: ear canals and TM's normal and mouth without ulcers or lesions     NECK: no adenopathy, no asymmetry, masses, or scars and thyroid normal to palpation     RESP: lungs clear to auscultation - no rales, rhonchi or wheezes     CV: regular rate and rhythm, normal S1 S2, no S3 or S4 and no murmur, click or rub     ABDOMEN:  soft, nontender, no HSM or masses and bowel sounds normal    per Dr Cowart     MS: extremities normal- no gross deformities noted, no evidence of inflammation in joints, FROM in " all extremities.     SKIN: Scattered irregular brown nevi largest on right lower back slightly atypical no other suspicious lesions or rashes     NEURO: Normal strength and tone,  mentation intact and speech normal     PSYCH: mentation appears normal. and affect normal/bright     LYMPHATICS: No cervical adenopathy  PHQ 7/18/2022   PHQ-9 Total Score 6   Q9: Thoughts of better off dead/self-harm past 2 weeks Not at all     JAIRO-7 SCORE 7/18/2022   Total Score 3     Results for orders placed or performed in visit on 07/18/22   Lipid panel reflex to direct LDL Fasting     Status: None   Result Value Ref Range    Cholesterol 149 <200 mg/dL    Triglycerides 50 <150 mg/dL    Direct Measure HDL 56 >=40 mg/dL    LDL Cholesterol Calculated 83 <=100 mg/dL    Non HDL Cholesterol 93 <130 mg/dL    Patient Fasting > 8hrs? Yes     Narrative    Cholesterol  Desirable:  <200 mg/dL    Triglycerides  Normal:  Less than 150 mg/dL  Borderline High:  150-199 mg/dL  High:  200-499 mg/dL  Very High:  Greater than or equal to 500 mg/dL    Direct Measure HDL  Female:  Greater than or equal to 50 mg/dL   Male:  Greater than or equal to 40 mg/dL    LDL Cholesterol  Desirable:  <100mg/dL  Above Desirable:  100-129 mg/dL   Borderline High:  130-159 mg/dL   High:  160-189 mg/dL   Very High:  >= 190 mg/dL    Non HDL Cholesterol  Desirable:  130 mg/dL  Above Desirable:  130-159 mg/dL  Borderline High:  160-189 mg/dL  High:  190-219 mg/dL  Very High:  Greater than or equal to 220 mg/dL   Comprehensive metabolic panel     Status: Normal   Result Value Ref Range    Sodium 139 133 - 144 mmol/L    Potassium 4.0 3.4 - 5.3 mmol/L    Chloride 103 94 - 109 mmol/L    Carbon Dioxide (CO2) 30 20 - 32 mmol/L    Anion Gap 6 3 - 14 mmol/L    Urea Nitrogen 8 7 - 30 mg/dL    Creatinine 1.11 0.66 - 1.25 mg/dL    Calcium 9.2 8.5 - 10.1 mg/dL    Glucose 88 70 - 99 mg/dL    Alkaline Phosphatase 56 40 - 150 U/L    AST 16 0 - 45 U/L    ALT 13 0 - 70 U/L    Protein Total  "7.3 6.8 - 8.8 g/dL    Albumin 4.0 3.4 - 5.0 g/dL    Bilirubin Total 0.6 0.2 - 1.3 mg/dL    GFR Estimate 90 >60 mL/min/1.73m2     Results after visit  ASSESSMENT/PLAN:       ICD-10-CM    1. Encounter for screening and preventative care  Z00.00    2. Atypical nevi  D22.9 Adult Dermatology Referral   3. Family history of cardiac disorder  Z82.49 Lipid panel reflex to direct LDL Fasting     Comprehensive metabolic panel   Reviewed due to the history of cardiac conditions we will check fasting lipids.  Referral was placed for adult dermatology referral.  Annual visit  He declined need for assistance with mental health as he is transitioning to a job felt it was more likely related to transitioning to a new job.    Patient has been advised of split billing requirements and indicates understanding: Yes  COUNSELING:  Reviewed preventive health counseling, as reflected in patient instructions       Regular exercise       Healthy diet/nutrition       Consider Hep C screening for all patients one time for ages 18-79 years       lipids    Estimated body mass index is 20.81 kg/m  as calculated from the following:    Height as of this encounter: 1.778 m (5' 10\").    Weight as of this encounter: 65.8 kg (145 lb).    Weight management plan noted, stable and monitoring    He reports that he has never smoked. He has never used smokeless tobacco.    Has used canibus edibles/ vape advised against  Counseling Resources:  ATP IV Guidelines  Pooled Cohorts Equation Calculator  FRAX Risk Assessment  ICSI Preventive Guidelines  Dietary Guidelines for Americans, 2010  [employer]'s MyPlate  ASA Prophylaxis  Lung CA Screening    Abisai Arrington MD  [facility] [place]  "

## 2022-07-20 NOTE — RESULT ENCOUNTER NOTE
Dear Flip Lopez   Your cholesterol,  kidney, liver, blood sugar,calcium, sodium and potassium were normal or within acceptable range.   Best wishes,  Abisai Arrington MD

## 2022-09-10 ENCOUNTER — HEALTH MAINTENANCE LETTER (OUTPATIENT)
Age: 32
End: 2022-09-10

## 2023-08-14 ASSESSMENT — ENCOUNTER SYMPTOMS: EYE IRRITATION: 1

## 2023-08-21 ENCOUNTER — OFFICE VISIT (OUTPATIENT)
Dept: FAMILY MEDICINE | Facility: CLINIC | Age: 33
End: 2023-08-21
Payer: COMMERCIAL

## 2023-08-21 VITALS
SYSTOLIC BLOOD PRESSURE: 105 MMHG | DIASTOLIC BLOOD PRESSURE: 70 MMHG | HEIGHT: 70 IN | OXYGEN SATURATION: 98 % | BODY MASS INDEX: 20.64 KG/M2 | HEART RATE: 84 BPM | TEMPERATURE: 98.1 F | WEIGHT: 144.2 LBS

## 2023-08-21 DIAGNOSIS — D22.9 ATYPICAL NEVI: ICD-10-CM

## 2023-08-21 DIAGNOSIS — T14.8XXA ABRASION: ICD-10-CM

## 2023-08-21 DIAGNOSIS — Z00.00 ENCOUNTER FOR PREVENTATIVE ADULT HEALTH CARE EXAMINATION: Primary | ICD-10-CM

## 2023-08-21 PROCEDURE — 99395 PREV VISIT EST AGE 18-39: CPT | Mod: 25 | Performed by: FAMILY MEDICINE

## 2023-08-21 PROCEDURE — 90636 HEP A/HEP B VACC ADULT IM: CPT | Performed by: FAMILY MEDICINE

## 2023-08-21 PROCEDURE — 90471 IMMUNIZATION ADMIN: CPT | Performed by: FAMILY MEDICINE

## 2023-08-21 RX ORDER — MUPIROCIN 20 MG/G
OINTMENT TOPICAL 2 TIMES DAILY
Qty: 30 G | Refills: 1 | Status: SHIPPED | OUTPATIENT
Start: 2023-08-21

## 2023-08-21 ASSESSMENT — ANXIETY QUESTIONNAIRES
2. NOT BEING ABLE TO STOP OR CONTROL WORRYING: NOT AT ALL
3. WORRYING TOO MUCH ABOUT DIFFERENT THINGS: SEVERAL DAYS
GAD7 TOTAL SCORE: 4
6. BECOMING EASILY ANNOYED OR IRRITABLE: SEVERAL DAYS
7. FEELING AFRAID AS IF SOMETHING AWFUL MIGHT HAPPEN: SEVERAL DAYS
5. BEING SO RESTLESS THAT IT IS HARD TO SIT STILL: NOT AT ALL
1. FEELING NERVOUS, ANXIOUS, OR ON EDGE: SEVERAL DAYS
GAD7 TOTAL SCORE: 4
IF YOU CHECKED OFF ANY PROBLEMS ON THIS QUESTIONNAIRE, HOW DIFFICULT HAVE THESE PROBLEMS MADE IT FOR YOU TO DO YOUR WORK, TAKE CARE OF THINGS AT HOME, OR GET ALONG WITH OTHER PEOPLE: NOT DIFFICULT AT ALL

## 2023-08-21 ASSESSMENT — PATIENT HEALTH QUESTIONNAIRE - PHQ9
5. POOR APPETITE OR OVEREATING: NOT AT ALL
SUM OF ALL RESPONSES TO PHQ QUESTIONS 1-9: 5
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
## 2023-08-21 NOTE — NURSING NOTE
Flip Lopez is a 33 year old male patient that presents today in clinic for the following:    Chief Complaint   Patient presents with    Physical     Routine     The patient's allergies and medications were reviewed as noted. A set of vitals were recorded as noted without incident. The patient does not have any other questions for the provider.    Mason Corea, EMT at 4:52 PM on 8/21/2023

## 2023-10-06 ENCOUNTER — ALLIED HEALTH/NURSE VISIT (OUTPATIENT)
Dept: INTERNAL MEDICINE | Facility: CLINIC | Age: 33
End: 2023-10-06
Payer: COMMERCIAL

## 2023-10-06 DIAGNOSIS — Z23 NEED FOR VACCINATION: Primary | ICD-10-CM

## 2023-10-06 PROCEDURE — 90471 IMMUNIZATION ADMIN: CPT

## 2023-10-06 PROCEDURE — 90636 HEP A/HEP B VACC ADULT IM: CPT

## 2023-10-06 NOTE — PROGRESS NOTES
Flip Lopez received the second Twinrix (HepA-HepB) vaccine today in clinic at the request of Dr. Arrington. The immunization was given under the supervision of Dr. Webb if assistance was needed. The patient does not report a history of adverse reactions associated with vaccine administration. The immunization site was cleaned with an alcohol prep wipe. The immunization was given without incident--see immunization list for administration details. No swelling or redness was observed at the site of injection after the immunization was given. The patient was advised to remain in fourth [facility] for fifteen minutes after the injection in case of an adverse reaction.     Neetu Grimaldo, EMT at 3:07 PM on 10/6/2023

## 2024-01-16 ENCOUNTER — ANCILLARY PROCEDURE (OUTPATIENT)
Dept: ULTRASOUND IMAGING | Facility: CLINIC | Age: 34
End: 2024-01-16
Attending: INTERNAL MEDICINE
Payer: COMMERCIAL

## 2024-01-16 ENCOUNTER — OFFICE VISIT (OUTPATIENT)
Dept: INTERNAL MEDICINE | Facility: CLINIC | Age: 34
End: 2024-01-16
Payer: COMMERCIAL

## 2024-01-16 VITALS
BODY MASS INDEX: 21.55 KG/M2 | HEART RATE: 76 BPM | HEIGHT: 70 IN | WEIGHT: 150.5 LBS | DIASTOLIC BLOOD PRESSURE: 62 MMHG | SYSTOLIC BLOOD PRESSURE: 94 MMHG | OXYGEN SATURATION: 95 %

## 2024-01-16 DIAGNOSIS — N50.812 TESTICULAR PAIN, LEFT: ICD-10-CM

## 2024-01-16 DIAGNOSIS — N50.812 TESTICULAR PAIN, LEFT: Primary | ICD-10-CM

## 2024-01-16 LAB
ALBUMIN UR-MCNC: NEGATIVE MG/DL
APPEARANCE UR: CLEAR
BILIRUB UR QL STRIP: NEGATIVE
COLOR UR AUTO: NORMAL
GLUCOSE UR STRIP-MCNC: NEGATIVE MG/DL
HGB UR QL STRIP: NEGATIVE
KETONES UR STRIP-MCNC: NEGATIVE MG/DL
LEUKOCYTE ESTERASE UR QL STRIP: NEGATIVE
NITRATE UR QL: NEGATIVE
PH UR STRIP: 7 [PH] (ref 5–7)
SP GR UR STRIP: 1 (ref 1–1.03)
UROBILINOGEN UR STRIP-MCNC: NORMAL MG/DL

## 2024-01-16 PROCEDURE — 99213 OFFICE O/P EST LOW 20 MIN: CPT | Mod: GC

## 2024-01-16 PROCEDURE — 81003 URINALYSIS AUTO W/O SCOPE: CPT | Performed by: PATHOLOGY

## 2024-01-16 PROCEDURE — 76870 US EXAM SCROTUM: CPT | Performed by: RADIOLOGY

## 2024-01-16 NOTE — PROGRESS NOTES
Internal Medicine Primary Care   SUBJECTIVE  CC: Left sided testicular pain    HPI: Flip Lopez is a 33 year old male with a PMHx of testicular hypofunction 2/2 b/l varicoceles s/p varicocele repair with urology in 2021. He presents today with a compliant of left testicular pain that began 1 week ago. He denies any precipitating trauma/injury, he has not engaged in any sexual activity recently, no new exercises, he denies fever, chills, sweats, nausea, vomiting.       PAST MEDICAL HISTORY  Patient Active Problem List   Diagnosis    Bilateral varicoceles    Testicular hypofunction    Brachioradialis muscle tenderness    Numbness and tingling in right hand    Radial neuropathy, right    DDD (degenerative disc disease), cervical         MEDICATIONS  Current Outpatient Medications   Medication Sig Dispense Refill    Acetaminophen (TYLENOL PO)       cetirizine (ZYRTEC) 10 MG tablet Take 10 mg by mouth daily as needed for allergies      mupirocin (BACTROBAN) 2 % external ointment Apply topically 2 times daily Apply twice daily cover with bandage until healed (Patient not taking: Reported on 1/16/2024) 30 g 1         PAST SURGICAL HISTORY  Past Surgical History:   Procedure Laterality Date    PE TUBES      TONSILLECTOMY Bilateral 9/10/2014    Procedure: TONSILLECTOMY;  Surgeon: Heather Marie MD;  Location: [facility] OR    VARICOCELECTOMY BILATERAL Bilateral 12/14/2021    Procedure: BILATERAL VARICOCELE REPAIR;  Surgeon: Bernabe Cowart MD;  [place] [facility] [place]    [facility]           FAMILY HISTORY  Family History   Problem Relation Age of Onset    Heart Disease Mother 45        stent in coronary artery    Cerebrovascular Disease Mother 52    Obesity Father     No Known Problems Sister     LUNG DISEASE Maternal Grandmother     Heart Disease Maternal Grandfather 65        pacemaker    Diabetes Paternal Grandmother     Coronary Artery Disease Maternal Uncle 57        age 55 stent in  "coronary artery    Seizure Disorder Maternal Uncle          ALLERGIES     Allergies   Allergen Reactions    Seasonal Allergies          OBJECTIVE    Vitals  BP 94/62 (BP Location: Right arm, Patient Position: Sitting, Cuff Size: Adult Regular)   Pulse 76   Ht 1.786 m (5' 10.32\")   Wt 68.3 kg (150 lb 8 oz)   SpO2 95%   BMI 21.40 kg/m      Last 6 BPs  BP Readings from Last 6 Encounters:   01/16/24 94/62   08/21/23 105/70   07/18/22 106/65   06/09/22 109/73   03/02/22 97/60   01/12/22 105/70       Physical Exam  Constitutional:       Appearance: Normal appearance.   Eyes:      Extraocular Movements: Extraocular movements intact.   Genitourinary:     Penis: Normal.       Testes: Normal.      Comments: No masses noted on exam, no hernia, no tenderness on palpation. No erythema or edema on inspection.   Neurological:      Mental Status: He is alert.         ASSESSMENT AND PLAN    Acute left testicular pain  No high riding testes, no signs of systemic illness. Unlikely to be STIs given history. ? Pos/op complication from b/l varicocele repair.   - testicular US  - [facility]   - If above results are negative, urological referral would be beneficial  - Advised patient to take NSAIDS as needed or use ice packs for acute pain    Patient understands and agrees with the above assessment and plan. Patient was staffed and seen with Dr. Fagan    Follow up  RTC as needed       Dary Otoole DO  Internal medicine PGY-3    Attending Addendum:  Patient seen and examined with resident in clinic today.  Pertinent portions of history and exam were independently verified by myself.  I agree with the exam and plan as outlined above with the following modifications: exam was verified by myself and normal. Advised follow-up with urology if diagnosis not apparent.  Keren Fagan MD  Internal Medicine      "

## 2024-01-16 NOTE — PROGRESS NOTES
Flip is a 33 year old that presents in clinic today for the following:     Chief Complaint   Patient presents with    Pain     Pt has dull pain and discomfort in groin area which was first noticed last Wednesday (1/10)            1/16/2024     2:03 PM   Additional Questions   Roomed by Kamilla Ibarra   Accompanied by N/A       Screenings as of today     PHQ-2 Total Score (Adult) - Positive if 3 or more points; Administer   PHQ-9 if positive 0        Kamilla Ibarra at 2:09 PM on 1/16/2024

## 2024-02-08 ENCOUNTER — OFFICE VISIT (OUTPATIENT)
Dept: FAMILY MEDICINE | Facility: CLINIC | Age: 34
End: 2024-02-08
Payer: COMMERCIAL

## 2024-02-08 VITALS
DIASTOLIC BLOOD PRESSURE: 77 MMHG | HEART RATE: 68 BPM | WEIGHT: 150 LBS | OXYGEN SATURATION: 97 % | SYSTOLIC BLOOD PRESSURE: 120 MMHG | BODY MASS INDEX: 21.33 KG/M2 | TEMPERATURE: 97.9 F | RESPIRATION RATE: 18 BRPM

## 2024-02-08 DIAGNOSIS — H91.90 SUBJECTIVE HEARING CHANGE: Primary | ICD-10-CM

## 2024-02-08 PROCEDURE — 99213 OFFICE O/P EST LOW 20 MIN: CPT | Performed by: PHYSICIAN ASSISTANT

## 2024-02-08 NOTE — PROGRESS NOTES
Assessment & Plan     Subjective hearing change and aural fullness   Pt would like reassessment with ENT.  Referral to ENT.     Reassured of normal exam.    Recommend flonase and zyrtec daily.    RTC for persistent or worsening sx.       - Adult ENT  Referral       No follow-ups on file.    LEIGH ANN Verdugo [facility]    Tony Castelan is a 33 year old male who presents to clinic today for the following health issues:  Chief Complaint   Patient presents with    ear issues     Pressure in ears, difficulty hearing       HPI    B ear congestion,  fullness. R grater than L.  Comes and goes.    No popping and cracking in the ears   No drainage.  No dizziness, no HA.   No pain currently but was a few weeks ago.    Heart beat sound on the R at times.  Blunted hearing B   Has had similar sx in the past but pt feels worsening.    MRI at that time negative skull base and brain as well as normal vestibular testing.    Pt is not consistently taking his allergy medication or flonase.    He denies any nasal congestion, rhionrrhea, PND.        Review of Systems  Constitutional, HEENT, cardiovascular, pulmonary, gi and gu systems are negative, except as otherwise noted.      Objective    /77 (BP Location: Right arm, Patient Position: Sitting, Cuff Size: Adult Regular)   Pulse 68   Temp 97.9  F (36.6  C) (Oral)   Resp 18   Wt 68 kg (150 lb)   SpO2 97%   BMI 21.33 kg/m    Physical Exam   Nad appears well  Ears patent B, TMS intact, noninjected.   Nasal mucosa nonedematous.   Hearing grossly intact.    Neck supple no adenopathy.

## 2024-02-28 ENCOUNTER — TELEPHONE (OUTPATIENT)
Dept: DERMATOLOGY | Facility: CLINIC | Age: 34
End: 2024-02-28
Payer: COMMERCIAL

## 2024-02-28 NOTE — TELEPHONE ENCOUNTER
Left Voicemail (1st Attempt) and Sent Mychart (1st Attempt) for the patient to call back and schedule the following:        Appointment type: New   Provider: Sandra Burrows  Return date: 7/03/24  Specialty phone number: [phone]

## 2024-04-22 ENCOUNTER — TELEPHONE (OUTPATIENT)
Dept: FAMILY MEDICINE | Facility: CLINIC | Age: 34
End: 2024-04-22
Payer: COMMERCIAL

## 2024-04-22 NOTE — TELEPHONE ENCOUNTER
Patient confirmed scheduled appointment:  Date: 8/26/2024  Time: 5:00pm  Visit type: [facility] PHYSICAL  Provider: PCP  Location: [facility] [place]

## 2024-08-20 ENCOUNTER — TRANSFERRED RECORDS (OUTPATIENT)
Dept: HEALTH INFORMATION MANAGEMENT | Facility: CLINIC | Age: 34
End: 2024-08-20
Payer: COMMERCIAL

## 2024-11-24 ENCOUNTER — HEALTH MAINTENANCE LETTER (OUTPATIENT)
Age: 34
End: 2024-11-24

## 2025-03-31 NOTE — LETTER
"6/9/2022       RE: Flip Lopez  [address] [place] [address]     Dear Colleague,    Thank you for referring your patient, Flip Lopez, to the [facility] [place] [facility]. Please see a copy of my visit note below.    HPI:  Flip Lopez is a 32 year old male being seen for follow-up bilateral varicocele repair 12/14/21      Exam:  Physical Exam  /73   Pulse 71   Ht 1.778 m (5' 10\")   Wt 65.8 kg (145 lb)   BMI 20.81 kg/m      General: Alert, oriented, nad.  Pleasant and conversant.  Eyes: anicteric, EOMI.  Pulse: regular  Resps: normal, non-labored.  Abdomen:  Nondistended.  Neurological - no tremors  Skin - no discoloration/ lesions noted   exam   Phallus normal   Testes ++, anodular, nontender.  Vas and epididymis present and normal bilaterally  No varicocele noted.  COLLEEN not indicated      Review of Imaging:  The following imaging exams were independently viewed and interpreted by me and discussed with patient:  N/A      Review of Labs:  The following labs were reviewed by me and discussed with the patient:  Component      Latest Ref Rng & Units 7/6/2021 6/6/2022   Collection Method       Masturbation Masturbation   Collection Site       [facility]   Specimen Type       Semen    Lab Receipt Time       11:50 AM    Time of Analysis       12:10 PM 10:30 AM   Analysis Temp - Centigrade      centigrade 23.5 23.0   Abstinence days      2 - 7 days 2 7   Liquefied      Yes Yes Yes   Viscous      No No No   Agglutination      No No No   pH      >=7.2 pH 7.6 7.6   Volume      >=1.4 mL 1.3 (A) 2.6   Concentration      >=16.0 million/ml 10 (A) 39.0   Total Number      >=39.0 million 13 (A) 101.0   Progressive motility      >=30 % 79 61   Non-progressive motility      % 4 11   Immotile      % 17 28   Total Progressive Motile      million 10 (A) 62.00   Vitality       ND    Normal Sperm      >=4 % Normal forms 1 " THAO Guerra   Social Work Care Manager   [facility]   [phone]        (A) 2 (L)   Abnormal Sperm       99 98   Head Defect      % 99 98   Midpiece Defect      % 29 46   Tail Defect      % 16 10   Round Cells      <=2.0 million/ml 0.6 0.3   WBC      % .    Immature Sperm      % .    Cell Fragments      % .    Consent to Release to Partner       Yes No   Time of Receipt        10:14 AM   Semen Analysis 2/15/21:  3.2ml,  pH 7.6, 29.9M/cc, 50% progressive, 0.5% morphology (>4%), Total Motile Count: 52.6 Million.         Assessment & Plan   1. Status-post bilateral varicocele repair.  Total progressive motile count improved to 62 million, his best semen analysis result yet.  Semen analysis results are better in most every measure.  2. Recommended keep trying with timed intercourse.  Discussed option of IUI (intrauterine inseminations) or IVF with a female fertility specialist.  3. Recommended repeat semen analysis in 3-4 months.  Lab ordered.  Will send results on [employer].    Bernabe Cowart MD  [facility] [place]      ==========================      Additional Coding Information:    Problems:  3 -- one stable chronic illness    Data Reviewed  3 or more studies reviewed, as listed above     Tests ordered: semen analysis     Level of risk:  3 -- low risk (e.g., OTC medication or observation, minor surgery without risks)    Time spent:  15 minutes spent on the date of the encounter doing chart review, history and exam, documentation and further activities per the note.

## 2025-05-12 ENCOUNTER — TRANSFERRED RECORDS (OUTPATIENT)
Dept: HEALTH INFORMATION MANAGEMENT | Facility: CLINIC | Age: 35
End: 2025-05-12
Payer: COMMERCIAL

## (undated) DEVICE — SU SILK 2-0 TIE 12X30" A305H

## (undated) DEVICE — DRAIN PENROSE 0.25"X18" LATEX FREE GR201

## (undated) DEVICE — DRAPE MICROSCOPE LEICA 54X150" AR8033650

## (undated) DEVICE — PROBE DOPPLER STR VTI 20MHZ DISP 108200

## (undated) DEVICE — ESU CORD BIPOLAR GREEN 10-4000

## (undated) DEVICE — NDL ANGIOCATH 14GA 1.25" 4048

## (undated) DEVICE — GLOVE PROTEXIS W/NEU-THERA 7.5  2D73TE75

## (undated) DEVICE — SYR 20ML LL W/O NDL 302830

## (undated) DEVICE — ESU ELEC NDL 1" COATED/INSULATED E1465

## (undated) DEVICE — DRSG STERI STRIP 1/2X4" R1547

## (undated) DEVICE — SU MONOCRYL 4-0 PS-2 18" UND Y496G

## (undated) DEVICE — BLADE CLIPPER SGL USE 9680

## (undated) DEVICE — VESSEL LOOPS BLUE MINI

## (undated) DEVICE — CATH TRAY FOLEY SURESTEP 16FR W/DRAIN BAG LF A300416A

## (undated) DEVICE — ESU GROUND PAD ADULT W/CORD E7507

## (undated) DEVICE — SU SILK 4-0 TIE 12X30" A303H

## (undated) DEVICE — DRAPE LAP TRANSVERSE 29421

## (undated) DEVICE — PACK MINOR CUSTOM ASC

## (undated) DEVICE — SYR 03ML LL W/O NDL 309657

## (undated) DEVICE — VESSEL LOOPS RED MINI 31145710

## (undated) DEVICE — SOL NACL 0.9% IRRIG 500ML BOTTLE 2F7123

## (undated) DEVICE — SU CHROMIC 3-0 SH 27" G122H

## (undated) DEVICE — DRSG PRIMAPORE 02X3" 7133

## (undated) DEVICE — NDL BLUNT 18GA 1" W/O FILTER 305181

## (undated) DEVICE — PREP CHLORAPREP 26ML TINTED ORANGE  260815

## (undated) DEVICE — SOL BENZOIN 0.5OZ

## (undated) DEVICE — LINEN TOWEL PACK X5 5464

## (undated) DEVICE — NDL ANGIOCATH 20GA 1.25" 4056

## (undated) RX ORDER — CEFAZOLIN SODIUM 2 G/50ML
SOLUTION INTRAVENOUS
Status: DISPENSED
Start: 2021-12-14

## (undated) RX ORDER — EPHEDRINE SULFATE 50 MG/ML
INJECTION, SOLUTION INTRAMUSCULAR; INTRAVENOUS; SUBCUTANEOUS
Status: DISPENSED
Start: 2021-12-14

## (undated) RX ORDER — BUPIVACAINE HYDROCHLORIDE 5 MG/ML
INJECTION, SOLUTION EPIDURAL; INTRACAUDAL
Status: DISPENSED
Start: 2021-12-14

## (undated) RX ORDER — PROPOFOL 10 MG/ML
INJECTION, EMULSION INTRAVENOUS
Status: DISPENSED
Start: 2021-12-14

## (undated) RX ORDER — PAPAVERINE HYDROCHLORIDE 30 MG/ML
INJECTION INTRAMUSCULAR; INTRAVENOUS
Status: DISPENSED
Start: 2021-12-14

## (undated) RX ORDER — HYDROMORPHONE HYDROCHLORIDE 1 MG/ML
INJECTION, SOLUTION INTRAMUSCULAR; INTRAVENOUS; SUBCUTANEOUS
Status: DISPENSED
Start: 2021-12-14

## (undated) RX ORDER — BUPIVACAINE HYDROCHLORIDE 2.5 MG/ML
INJECTION, SOLUTION EPIDURAL; INFILTRATION; INTRACAUDAL
Status: DISPENSED
Start: 2021-12-14

## (undated) RX ORDER — GABAPENTIN 300 MG/1
CAPSULE ORAL
Status: DISPENSED
Start: 2021-12-14

## (undated) RX ORDER — LIDOCAINE HYDROCHLORIDE 20 MG/ML
INJECTION, SOLUTION EPIDURAL; INFILTRATION; INTRACAUDAL; PERINEURAL
Status: DISPENSED
Start: 2021-12-14

## (undated) RX ORDER — ONDANSETRON 2 MG/ML
INJECTION INTRAMUSCULAR; INTRAVENOUS
Status: DISPENSED
Start: 2021-12-14

## (undated) RX ORDER — GLYCOPYRROLATE 0.2 MG/ML
INJECTION, SOLUTION INTRAMUSCULAR; INTRAVENOUS
Status: DISPENSED
Start: 2021-12-14

## (undated) RX ORDER — KETOROLAC TROMETHAMINE 30 MG/ML
INJECTION, SOLUTION INTRAMUSCULAR; INTRAVENOUS
Status: DISPENSED
Start: 2021-12-14

## (undated) RX ORDER — FENTANYL CITRATE 50 UG/ML
INJECTION, SOLUTION INTRAMUSCULAR; INTRAVENOUS
Status: DISPENSED
Start: 2021-12-14

## (undated) RX ORDER — DEXAMETHASONE SODIUM PHOSPHATE 4 MG/ML
INJECTION, SOLUTION INTRA-ARTICULAR; INTRALESIONAL; INTRAMUSCULAR; INTRAVENOUS; SOFT TISSUE
Status: DISPENSED
Start: 2021-12-14

## (undated) RX ORDER — ACETAMINOPHEN 325 MG/1
TABLET ORAL
Status: DISPENSED
Start: 2021-12-14